# Patient Record
Sex: MALE | Race: WHITE | NOT HISPANIC OR LATINO | Employment: FULL TIME | URBAN - METROPOLITAN AREA
[De-identification: names, ages, dates, MRNs, and addresses within clinical notes are randomized per-mention and may not be internally consistent; named-entity substitution may affect disease eponyms.]

---

## 2023-10-17 PROBLEM — F41.9 ANXIETY: Status: ACTIVE | Noted: 2023-10-17

## 2023-10-17 PROBLEM — K21.9 GERD (GASTROESOPHAGEAL REFLUX DISEASE): Status: ACTIVE | Noted: 2023-10-17

## 2023-10-17 PROBLEM — I49.3 PVC (PREMATURE VENTRICULAR CONTRACTION): Status: ACTIVE | Noted: 2023-10-17

## 2023-10-17 PROBLEM — U09.9 POST COVID-19 CONDITION, UNSPECIFIED: Status: ACTIVE | Noted: 2023-10-17

## 2023-10-17 PROBLEM — J45.40 MODERATE PERSISTENT ASTHMA (HHS-HCC): Status: ACTIVE | Noted: 2023-10-17

## 2023-10-17 PROBLEM — L72.3 SEBACEOUS CYST: Status: ACTIVE | Noted: 2023-10-17

## 2023-10-17 PROBLEM — R91.1 LUNG NODULE: Status: ACTIVE | Noted: 2023-10-17

## 2023-10-17 PROBLEM — R93.89 ABNORMAL CHEST CT: Status: ACTIVE | Noted: 2023-10-17

## 2023-10-17 PROBLEM — R07.9 CHEST PAIN: Status: ACTIVE | Noted: 2023-10-17

## 2023-10-17 PROBLEM — J45.909 ASTHMA (HHS-HCC): Status: ACTIVE | Noted: 2023-10-17

## 2023-10-17 PROBLEM — R00.2 PALPITATIONS: Status: ACTIVE | Noted: 2023-10-17

## 2023-10-17 PROBLEM — R06.02 SHORTNESS OF BREATH: Status: ACTIVE | Noted: 2023-10-17

## 2023-10-17 PROBLEM — M79.672 LEFT FOOT PAIN: Status: ACTIVE | Noted: 2023-10-17

## 2023-10-17 PROBLEM — K57.90 DIVERTICULOSIS: Status: ACTIVE | Noted: 2023-10-17

## 2023-10-17 PROBLEM — E66.9 OBESITY: Status: ACTIVE | Noted: 2023-10-17

## 2023-10-17 PROBLEM — F17.200 NICOTINE DEPENDENCE: Status: ACTIVE | Noted: 2023-10-17

## 2023-10-17 RX ORDER — FAMOTIDINE 20 MG/1
1 TABLET, FILM COATED ORAL 2 TIMES DAILY
COMMUNITY
Start: 2022-09-02 | End: 2023-10-18 | Stop reason: ALTCHOICE

## 2023-10-17 RX ORDER — NEBIVOLOL 5 MG/1
1 TABLET ORAL DAILY
COMMUNITY
Start: 2019-08-05 | End: 2023-10-18 | Stop reason: SDUPTHER

## 2023-10-17 RX ORDER — OMEPRAZOLE 40 MG/1
1 CAPSULE, DELAYED RELEASE ORAL DAILY
COMMUNITY
Start: 2020-01-28 | End: 2023-10-18 | Stop reason: SDUPTHER

## 2023-10-17 RX ORDER — ALBUTEROL SULFATE 90 UG/1
2 AEROSOL, METERED RESPIRATORY (INHALATION)
COMMUNITY
Start: 2020-01-09 | End: 2023-10-18 | Stop reason: SDUPTHER

## 2023-10-17 RX ORDER — FLUTICASONE PROPIONATE AND SALMETEROL 232; 14 UG/1; UG/1
POWDER, METERED RESPIRATORY (INHALATION)
COMMUNITY
Start: 2023-03-13

## 2023-10-18 ENCOUNTER — OFFICE VISIT (OUTPATIENT)
Dept: PRIMARY CARE | Facility: CLINIC | Age: 43
End: 2023-10-18
Payer: COMMERCIAL

## 2023-10-18 VITALS
TEMPERATURE: 96.9 F | BODY MASS INDEX: 35.79 KG/M2 | HEART RATE: 93 BPM | DIASTOLIC BLOOD PRESSURE: 80 MMHG | WEIGHT: 250 LBS | SYSTOLIC BLOOD PRESSURE: 140 MMHG | OXYGEN SATURATION: 97 % | RESPIRATION RATE: 20 BRPM | HEIGHT: 70 IN

## 2023-10-18 DIAGNOSIS — R00.2 PALPITATIONS: ICD-10-CM

## 2023-10-18 DIAGNOSIS — Z13.220 NEED FOR LIPID SCREENING: ICD-10-CM

## 2023-10-18 DIAGNOSIS — K21.9 GASTROESOPHAGEAL REFLUX DISEASE, UNSPECIFIED WHETHER ESOPHAGITIS PRESENT: ICD-10-CM

## 2023-10-18 DIAGNOSIS — M10.9 ACUTE GOUT INVOLVING TOE OF LEFT FOOT, UNSPECIFIED CAUSE: Primary | ICD-10-CM

## 2023-10-18 DIAGNOSIS — J45.909 ASTHMA, UNSPECIFIED ASTHMA SEVERITY, UNSPECIFIED WHETHER COMPLICATED, UNSPECIFIED WHETHER PERSISTENT (HHS-HCC): ICD-10-CM

## 2023-10-18 PROCEDURE — 96372 THER/PROPH/DIAG INJ SC/IM: CPT | Performed by: INTERNAL MEDICINE

## 2023-10-18 PROCEDURE — 99213 OFFICE O/P EST LOW 20 MIN: CPT | Performed by: INTERNAL MEDICINE

## 2023-10-18 PROCEDURE — 1036F TOBACCO NON-USER: CPT | Performed by: INTERNAL MEDICINE

## 2023-10-18 RX ORDER — BUDESONIDE AND FORMOTEROL FUMARATE DIHYDRATE 160; 4.5 UG/1; UG/1
2 AEROSOL RESPIRATORY (INHALATION)
COMMUNITY
Start: 2019-03-25

## 2023-10-18 RX ORDER — KETOROLAC TROMETHAMINE 30 MG/ML
30 INJECTION, SOLUTION INTRAMUSCULAR; INTRAVENOUS ONCE
Status: SHIPPED | OUTPATIENT
Start: 2023-10-18 | End: 2023-10-23

## 2023-10-18 RX ORDER — OMEPRAZOLE 40 MG/1
40 CAPSULE, DELAYED RELEASE ORAL DAILY
Qty: 90 CAPSULE | Refills: 3 | Status: SHIPPED | OUTPATIENT
Start: 2023-10-18

## 2023-10-18 RX ORDER — NEBIVOLOL 5 MG/1
5 TABLET ORAL DAILY
Qty: 90 TABLET | Refills: 1 | Status: SHIPPED | OUTPATIENT
Start: 2023-10-18

## 2023-10-18 RX ORDER — KETOROLAC TROMETHAMINE 30 MG/ML
30 INJECTION, SOLUTION INTRAMUSCULAR; INTRAVENOUS ONCE
Status: DISCONTINUED | OUTPATIENT
Start: 2023-10-18 | End: 2023-10-18

## 2023-10-18 RX ORDER — COLCHICINE 0.6 MG/1
0.6 TABLET ORAL 2 TIMES DAILY
Qty: 20 TABLET | Refills: 0 | Status: SHIPPED | OUTPATIENT
Start: 2023-10-18 | End: 2023-11-30

## 2023-10-18 RX ORDER — KETOROLAC TROMETHAMINE 30 MG/ML
60 INJECTION, SOLUTION INTRAMUSCULAR; INTRAVENOUS ONCE
Status: DISCONTINUED | OUTPATIENT
Start: 2023-10-18 | End: 2023-10-18

## 2023-10-18 RX ORDER — KETOROLAC TROMETHAMINE 30 MG/ML
30 INJECTION, SOLUTION INTRAMUSCULAR; INTRAVENOUS ONCE
Status: COMPLETED | OUTPATIENT
Start: 2023-10-18 | End: 2023-10-18

## 2023-10-18 RX ORDER — ALBUTEROL SULFATE 90 UG/1
2 AEROSOL, METERED RESPIRATORY (INHALATION)
Qty: 18 G | Refills: 1 | Status: SHIPPED | OUTPATIENT
Start: 2023-10-18

## 2023-10-18 RX ADMIN — KETOROLAC TROMETHAMINE 30 MG: 30 INJECTION, SOLUTION INTRAMUSCULAR; INTRAVENOUS at 19:15

## 2023-10-18 SDOH — ECONOMIC STABILITY: FOOD INSECURITY: WITHIN THE PAST 12 MONTHS, YOU WORRIED THAT YOUR FOOD WOULD RUN OUT BEFORE YOU GOT MONEY TO BUY MORE.: NEVER TRUE

## 2023-10-18 SDOH — ECONOMIC STABILITY: FOOD INSECURITY: WITHIN THE PAST 12 MONTHS, THE FOOD YOU BOUGHT JUST DIDN'T LAST AND YOU DIDN'T HAVE MONEY TO GET MORE.: NEVER TRUE

## 2023-10-18 ASSESSMENT — ENCOUNTER SYMPTOMS
OCCASIONAL FEELINGS OF UNSTEADINESS: 0
DEPRESSION: 0
LOSS OF SENSATION IN FEET: 1

## 2023-10-18 ASSESSMENT — LIFESTYLE VARIABLES
HOW MANY STANDARD DRINKS CONTAINING ALCOHOL DO YOU HAVE ON A TYPICAL DAY: PATIENT DOES NOT DRINK
HOW OFTEN DO YOU HAVE SIX OR MORE DRINKS ON ONE OCCASION: NEVER
SKIP TO QUESTIONS 9-10: 1
HOW OFTEN DO YOU HAVE A DRINK CONTAINING ALCOHOL: NEVER
AUDIT-C TOTAL SCORE: 0

## 2023-10-18 ASSESSMENT — PATIENT HEALTH QUESTIONNAIRE - PHQ9
SUM OF ALL RESPONSES TO PHQ9 QUESTIONS 1 & 2: 0
2. FEELING DOWN, DEPRESSED OR HOPELESS: NOT AT ALL
1. LITTLE INTEREST OR PLEASURE IN DOING THINGS: NOT AT ALL

## 2023-10-18 ASSESSMENT — PAIN SCALES - GENERAL: PAINLEVEL: 6

## 2023-10-18 NOTE — ASSESSMENT & PLAN NOTE
Will give Toradol 60 mg IM x1 dose, will order Colchicine to take as needed.  Check labs as ordered to evaluate

## 2023-10-18 NOTE — PROGRESS NOTES
Chief Complaint/HPI:  Initial visit with me, previously saw Ms Esa Bravo for several years, patient states.    Asthma: patient state that that he has asthma since he was a child, he has taken Breo, he has a high deductible plan, he sees pulmonary medicine at [facility] , patient states that they prescribe his meds when they are needed    Foot pain: patient states that he gets severe pain in the base of the toe, he got severe pain a few days ago in the left foot that has started to subside. Patient states that provider previously thought that the symptoms were due to gout.   Patient states that the pain gets better and worse. Patient states that the toe swells , he is not able to wear his work boots due to the pain.    GERD: he takes omeprazole    ROS otherwise negative aside from what was mentioned above in HPI.      Patient Active Problem List   Diagnosis    Abnormal chest CT    Anxiety    Asthma    Chest pain    Diverticulosis    GERD (gastroesophageal reflux disease)    Left foot pain    Lung nodule    Moderate persistent asthma    Nicotine dependence    Obesity    Shortness of breath    Sebaceous cyst    PVC (premature ventricular contraction)    Post covid-19 condition, unspecified    Palpitations    Acute gout involving toe of left foot         Past Medical History:   Diagnosis Date    Allergic Age 5    Anxiety Age 29    Asthma Age 5    Depression Age30    Personal history of COVID-19     History of COVID-19    Personal history of other diseases of the digestive system 01/16/2019    History of diverticulosis    Personal history of other diseases of the respiratory system     History of asthma    Personal history of other malignant neoplasm of skin 01/16/2019    History of malignant neoplasm of skin    Personal history of other specified conditions 01/16/2019    History of chest pain     Past Surgical History:   Procedure Laterality Date    OTHER SURGICAL HISTORY  01/17/2019    [place] tooth  "extraction    OTHER SURGICAL HISTORY  01/08/2020    Cyst excision     Social History     Social History Narrative    Not on file         ALLERGIES  Amoxicillin-pot clavulanate, Escitalopram oxalate, and Fluoxetine      MEDICATIONS  Current Outpatient Medications on File Prior to Visit   Medication Sig Dispense Refill    AirDuo Digihaler 232-14 mcg/actuation inhaler       [DISCONTINUED] albuterol 90 mcg/actuation inhaler Inhale 2 puffs 3 times a day.      [DISCONTINUED] nebivolol (Bystolic) 5 mg tablet Take 1 tablet (5 mg) by mouth once daily.      [DISCONTINUED] omeprazole (PriLOSEC) 40 mg DR capsule Take 1 capsule (40 mg) by mouth once daily.      budesonide-formoteroL (Symbicort) 160-4.5 mcg/actuation inhaler Inhale 2 puffs 2 times a day.      [DISCONTINUED] famotidine (Pepcid) 20 mg tablet Take 1 tablet (20 mg) by mouth 2 times a day.      [DISCONTINUED] fluticasone furoate-vilanteroL (Breo Ellipta) 100-25 mcg/dose inhaler INHALE 1 PUFF BY MOUTH DAILY (Patient not taking: Reported on 10/18/2023) 60 each 0     No current facility-administered medications on file prior to visit.         PHYSICAL EXAM  /80 (BP Location: Left arm, Patient Position: Sitting, BP Cuff Size: Large adult)   Pulse 93   Temp 36.1 °C (96.9 °F)   Resp 20   Ht 1.765 m (5' 9.5\")   Wt 113 kg (250 lb)   SpO2 97%   BMI 36.39 kg/m²   Body mass index is 36.39 kg/m².  Gen: Alert, NAD  HEENT:  PERRLA, EOMI, conjunctiva and sclera normal in appearance  Respiratory:  Lungs CTAB  Cardiovascular:  Heart RRR. No M/R/G  Neuro:  Gross motor and sensory intact  MSK: Marked tenderness and moderate swelling of the MTP joint of the left great toe   Skin:  No suspicious lesions present    ASSESSMENT/PLAN  Problem List Items Addressed This Visit       Asthma    Relevant Medications    albuterol 90 mcg/actuation inhaler    Other Relevant Orders    Comprehensive metabolic panel    CBC and Auto Differential    GERD (gastroesophageal reflux disease) - " Primary    Relevant Medications    omeprazole (PriLOSEC) 40 mg DR capsule    Other Relevant Orders    Comprehensive metabolic panel    CBC and Auto Differential    Palpitations    Relevant Medications    nebivolol (Bystolic) 5 mg tablet    Other Relevant Orders    Comprehensive metabolic panel    CBC and Auto Differential    Acute gout involving toe of left foot    Current Assessment & Plan     Will give Toradol 60 mg IM x1 dose, will order Colchicine to take as needed.  Check labs as ordered to evaluate         Relevant Medications    ketorolac (Toradol) injection 60 mg    colchicine, gout, 0.6 mg tablet    Other Relevant Orders    Comprehensive metabolic panel    CBC and Auto Differential    Uric acid     Other Visit Diagnoses       Need for lipid screening        Relevant Orders    Lipid panel          Follow up after testing    Bijan Wisdom MD

## 2023-11-29 ENCOUNTER — TELEPHONE (OUTPATIENT)
Dept: PRIMARY CARE | Facility: CLINIC | Age: 43
End: 2023-11-29
Payer: COMMERCIAL

## 2023-11-29 DIAGNOSIS — M10.9 ACUTE GOUT INVOLVING TOE OF LEFT FOOT, UNSPECIFIED CAUSE: Primary | ICD-10-CM

## 2023-11-29 NOTE — TELEPHONE ENCOUNTER
Patient is requesting a new script for  colchicine, gout, 0.6 mg tablet.  Previous script is .    Pharmacy is [facility] in [place]    NOV 2024  LOV 10/18/2023

## 2023-11-30 DIAGNOSIS — M10.9 ACUTE GOUT INVOLVING TOE OF LEFT FOOT, UNSPECIFIED CAUSE: ICD-10-CM

## 2023-11-30 RX ORDER — COLCHICINE 0.6 MG/1
0.6 TABLET ORAL 2 TIMES DAILY
Qty: 180 TABLET | Refills: 1 | Status: SHIPPED | OUTPATIENT
Start: 2023-11-30 | End: 2023-11-30 | Stop reason: SDUPTHER

## 2023-11-30 RX ORDER — COLCHICINE 0.6 MG/1
0.6 TABLET ORAL 2 TIMES DAILY
Qty: 180 TABLET | Refills: 1 | Status: SHIPPED | OUTPATIENT
Start: 2023-11-30

## 2024-07-12 ENCOUNTER — APPOINTMENT (OUTPATIENT)
Dept: PULMONOLOGY | Facility: HOSPITAL | Age: 44
End: 2024-07-12
Payer: COMMERCIAL

## 2024-07-23 ENCOUNTER — OFFICE VISIT (OUTPATIENT)
Dept: PULMONOLOGY | Facility: HOSPITAL | Age: 44
End: 2024-07-23
Payer: COMMERCIAL

## 2024-07-23 VITALS
HEIGHT: 71 IN | HEART RATE: 80 BPM | WEIGHT: 259.8 LBS | SYSTOLIC BLOOD PRESSURE: 115 MMHG | OXYGEN SATURATION: 96 % | DIASTOLIC BLOOD PRESSURE: 78 MMHG | BODY MASS INDEX: 36.37 KG/M2 | RESPIRATION RATE: 16 BRPM

## 2024-07-23 DIAGNOSIS — J30.9 ALLERGIC RHINITIS, UNSPECIFIED SEASONALITY, UNSPECIFIED TRIGGER: ICD-10-CM

## 2024-07-23 DIAGNOSIS — J45.909 ASTHMA, UNSPECIFIED ASTHMA SEVERITY, UNSPECIFIED WHETHER COMPLICATED, UNSPECIFIED WHETHER PERSISTENT (HHS-HCC): Primary | ICD-10-CM

## 2024-07-23 PROCEDURE — 3008F BODY MASS INDEX DOCD: CPT | Performed by: NURSE PRACTITIONER

## 2024-07-23 PROCEDURE — 99213 OFFICE O/P EST LOW 20 MIN: CPT | Performed by: NURSE PRACTITIONER

## 2024-07-23 PROCEDURE — 1036F TOBACCO NON-USER: CPT | Performed by: NURSE PRACTITIONER

## 2024-07-23 RX ORDER — ALBUTEROL SULFATE 90 UG/1
2 AEROSOL, METERED RESPIRATORY (INHALATION) EVERY 4 HOURS PRN
Qty: 18 G | Refills: 11 | Status: SHIPPED | OUTPATIENT
Start: 2024-07-23

## 2024-07-23 ASSESSMENT — PATIENT HEALTH QUESTIONNAIRE - PHQ9
SUM OF ALL RESPONSES TO PHQ9 QUESTIONS 1 AND 2: 0
2. FEELING DOWN, DEPRESSED OR HOPELESS: NOT AT ALL
1. LITTLE INTEREST OR PLEASURE IN DOING THINGS: NOT AT ALL

## 2024-07-23 ASSESSMENT — ENCOUNTER SYMPTOMS
FATIGUE: 0
WHEEZING: 0
RHINORRHEA: 0
SHORTNESS OF BREATH: 1
UNEXPECTED WEIGHT CHANGE: 0
COUGH: 0
CHILLS: 0
FEVER: 0

## 2024-07-23 ASSESSMENT — COLUMBIA-SUICIDE SEVERITY RATING SCALE - C-SSRS
1. IN THE PAST MONTH, HAVE YOU WISHED YOU WERE DEAD OR WISHED YOU COULD GO TO SLEEP AND NOT WAKE UP?: NO
6. HAVE YOU EVER DONE ANYTHING, STARTED TO DO ANYTHING, OR PREPARED TO DO ANYTHING TO END YOUR LIFE?: NO
2. HAVE YOU ACTUALLY HAD ANY THOUGHTS OF KILLING YOURSELF?: NO

## 2024-07-23 NOTE — PROGRESS NOTES
Subjective   Patient ID: Eugene Chang is a 44 y.o. male who presents for follow up asthma.     HPI: Patient has  PMH of asthma for which he takes Symbicort 1 puff once in the morning only and takes prn. He was detected positive for COVID on August 24th and had symptoms about 5 days before that. He was not hospitalized and did not get any specific treatment for that. He did not get vaccination for covid. He did have cough, wheezing and SOB severely for a week and after that he just had SOB which has improved. He still gets tired easily and short of breath more easily but is not limiting his daily life. His POX is 96% on RA. He does not have coughing or wheezing at this time other than his usual asthma. He has been seeing Dr. Bahena for chest pain and his stress test was negative. He had CT cardiac scoring that showed a lung nodule 4mm at left base and thoracic aortic enlargement of 3.6 cm. He used to smoke 1 ppd but quit 10 years ago, smoked 14 years x 1 ppd. For last 10 years he has been vaping 24 mg nicotine but no THC. He has done THC in the past. He has hx of snoring but no EDS or fatigue and reports he has Sleep study which he states was normal. He reports he does not have any sinus symptoms at present.      Patient is here today for follow up. He is still struggling with cost of inhalers. He currently has Symbicort but it is very expensive. He states that otherwise his breathing is stable. He is still vaping. He has no other concerns.    Review of Systems   Constitutional:  Negative for chills, fatigue, fever and unexpected weight change.   HENT:  Positive for congestion. Negative for postnasal drip and rhinorrhea.    Respiratory:  Positive for shortness of breath. Negative for cough (denies hemoptysis.) and wheezing.    Cardiovascular:  Negative for chest pain and leg swelling.   All other systems reviewed and are negative.      Objective   Physical Exam  Vitals reviewed.   Constitutional:       Appearance:  Normal appearance.   HENT:      Head: Normocephalic.   Cardiovascular:      Rate and Rhythm: Normal rate and regular rhythm.   Pulmonary:      Effort: Pulmonary effort is normal.      Breath sounds: Normal breath sounds.   Skin:     General: Skin is warm and dry.   Neurological:      Mental Status: He is alert.         Assessment/Plan   1. Bronchial asthma  2. Allergic rhinitis   3. Vapes with nicotine      Plan:     -Continue Symbicort. Will send a referral to our pharmacy here as his inhalers are very expensive. Recommend continue ICS/LABA.   -Continue Allegra prn. Consider Singulair if s/s worsen again.  -We will follow up with chest imaging only as needed if he has respiratory symptoms.   -PFTs with FEV1/FVC 78, FEV1 90, .  -He is currently vaping with nicotine daily.     Overall his asthma is controlled, I will continue ICS/LABA preferred by his insurance and placed a referral to our pharmacy. I will bring him back with me in one year. I instructed patient to call sooner if needed.    Total time:  20 min.

## 2024-07-23 NOTE — PATIENT INSTRUCTIONS
Continue on Symbicort as discussed.  I placed a referral to our pharmacy.   Continue albuterol as needed.   Call with any questions or concerns.  Follow up with me in one year.

## 2024-07-31 ENCOUNTER — LAB (OUTPATIENT)
Dept: LAB | Facility: LAB | Age: 44
End: 2024-07-31
Payer: COMMERCIAL

## 2024-07-31 DIAGNOSIS — J45.909 ASTHMA, UNSPECIFIED ASTHMA SEVERITY, UNSPECIFIED WHETHER COMPLICATED, UNSPECIFIED WHETHER PERSISTENT (HHS-HCC): ICD-10-CM

## 2024-07-31 DIAGNOSIS — M10.9 ACUTE GOUT INVOLVING TOE OF LEFT FOOT, UNSPECIFIED CAUSE: ICD-10-CM

## 2024-07-31 DIAGNOSIS — Z13.220 NEED FOR LIPID SCREENING: ICD-10-CM

## 2024-07-31 DIAGNOSIS — R00.2 PALPITATIONS: ICD-10-CM

## 2024-07-31 DIAGNOSIS — K21.9 GASTROESOPHAGEAL REFLUX DISEASE, UNSPECIFIED WHETHER ESOPHAGITIS PRESENT: ICD-10-CM

## 2024-07-31 LAB
ALBUMIN SERPL BCP-MCNC: 4 G/DL (ref 3.4–5)
ALP SERPL-CCNC: 68 U/L (ref 33–120)
ALT SERPL W P-5'-P-CCNC: 22 U/L (ref 10–52)
ANION GAP SERPL CALC-SCNC: 10 MMOL/L (ref 10–20)
AST SERPL W P-5'-P-CCNC: 15 U/L (ref 9–39)
BASOPHILS # BLD AUTO: 0.04 X10*3/UL (ref 0–0.1)
BASOPHILS NFR BLD AUTO: 0.6 %
BILIRUB SERPL-MCNC: 0.3 MG/DL (ref 0–1.2)
BUN SERPL-MCNC: 18 MG/DL (ref 6–23)
CALCIUM SERPL-MCNC: 8.4 MG/DL (ref 8.6–10.3)
CHLORIDE SERPL-SCNC: 107 MMOL/L (ref 98–107)
CHOLEST SERPL-MCNC: 183 MG/DL (ref 0–199)
CHOLESTEROL/HDL RATIO: 4.6
CO2 SERPL-SCNC: 26 MMOL/L (ref 21–32)
CREAT SERPL-MCNC: 0.94 MG/DL (ref 0.5–1.3)
EGFRCR SERPLBLD CKD-EPI 2021: >90 ML/MIN/1.73M*2
EOSINOPHIL # BLD AUTO: 0.13 X10*3/UL (ref 0–0.7)
EOSINOPHIL NFR BLD AUTO: 1.9 %
ERYTHROCYTE [DISTWIDTH] IN BLOOD BY AUTOMATED COUNT: 12.6 % (ref 11.5–14.5)
GLUCOSE SERPL-MCNC: 101 MG/DL (ref 74–99)
HCT VFR BLD AUTO: 40.4 % (ref 41–52)
HDLC SERPL-MCNC: 39.4 MG/DL
HGB BLD-MCNC: 13.4 G/DL (ref 13.5–17.5)
IMM GRANULOCYTES # BLD AUTO: 0.01 X10*3/UL (ref 0–0.7)
IMM GRANULOCYTES NFR BLD AUTO: 0.1 % (ref 0–0.9)
LDLC SERPL CALC-MCNC: 127 MG/DL
LYMPHOCYTES # BLD AUTO: 1.86 X10*3/UL (ref 1.2–4.8)
LYMPHOCYTES NFR BLD AUTO: 26.8 %
MCH RBC QN AUTO: 29.4 PG (ref 26–34)
MCHC RBC AUTO-ENTMCNC: 33.2 G/DL (ref 32–36)
MCV RBC AUTO: 89 FL (ref 80–100)
MONOCYTES # BLD AUTO: 0.67 X10*3/UL (ref 0.1–1)
MONOCYTES NFR BLD AUTO: 9.6 %
NEUTROPHILS # BLD AUTO: 4.24 X10*3/UL (ref 1.2–7.7)
NEUTROPHILS NFR BLD AUTO: 61 %
NON HDL CHOLESTEROL: 144 MG/DL (ref 0–149)
NRBC BLD-RTO: 0 /100 WBCS (ref 0–0)
PLATELET # BLD AUTO: 278 X10*3/UL (ref 150–450)
POTASSIUM SERPL-SCNC: 4.3 MMOL/L (ref 3.5–5.3)
PROT SERPL-MCNC: 6.5 G/DL (ref 6.4–8.2)
RBC # BLD AUTO: 4.56 X10*6/UL (ref 4.5–5.9)
SODIUM SERPL-SCNC: 139 MMOL/L (ref 136–145)
TRIGL SERPL-MCNC: 83 MG/DL (ref 0–149)
URATE SERPL-MCNC: 8 MG/DL (ref 4–7.5)
VLDL: 17 MG/DL (ref 0–40)
WBC # BLD AUTO: 7 X10*3/UL (ref 4.4–11.3)

## 2024-07-31 PROCEDURE — 85025 COMPLETE CBC W/AUTO DIFF WBC: CPT

## 2024-07-31 PROCEDURE — 84550 ASSAY OF BLOOD/URIC ACID: CPT

## 2024-07-31 PROCEDURE — 80061 LIPID PANEL: CPT

## 2024-07-31 PROCEDURE — 80053 COMPREHEN METABOLIC PANEL: CPT

## 2024-07-31 PROCEDURE — 36415 COLL VENOUS BLD VENIPUNCTURE: CPT

## 2024-08-08 ENCOUNTER — HOSPITAL ENCOUNTER (OUTPATIENT)
Dept: RADIOLOGY | Facility: CLINIC | Age: 44
Discharge: HOME | End: 2024-08-08
Payer: COMMERCIAL

## 2024-08-08 ENCOUNTER — LAB (OUTPATIENT)
Dept: LAB | Facility: LAB | Age: 44
End: 2024-08-08
Payer: COMMERCIAL

## 2024-08-08 ENCOUNTER — APPOINTMENT (OUTPATIENT)
Dept: PRIMARY CARE | Facility: CLINIC | Age: 44
End: 2024-08-08
Payer: COMMERCIAL

## 2024-08-08 VITALS
WEIGHT: 259.5 LBS | OXYGEN SATURATION: 97 % | TEMPERATURE: 97.1 F | BODY MASS INDEX: 36.33 KG/M2 | SYSTOLIC BLOOD PRESSURE: 126 MMHG | HEIGHT: 71 IN | RESPIRATION RATE: 20 BRPM | HEART RATE: 86 BPM | DIASTOLIC BLOOD PRESSURE: 79 MMHG

## 2024-08-08 DIAGNOSIS — M25.512 CHRONIC LEFT SHOULDER PAIN: ICD-10-CM

## 2024-08-08 DIAGNOSIS — R73.9 HYPERGLYCEMIA: ICD-10-CM

## 2024-08-08 DIAGNOSIS — M25.512 CHRONIC LEFT SHOULDER PAIN: Primary | ICD-10-CM

## 2024-08-08 DIAGNOSIS — Z11.3 SCREENING FOR STD (SEXUALLY TRANSMITTED DISEASE): ICD-10-CM

## 2024-08-08 DIAGNOSIS — M10.9 ACUTE GOUT INVOLVING TOE OF LEFT FOOT, UNSPECIFIED CAUSE: ICD-10-CM

## 2024-08-08 DIAGNOSIS — Z00.00 ANNUAL PHYSICAL EXAM: ICD-10-CM

## 2024-08-08 DIAGNOSIS — G89.29 CHRONIC LEFT SHOULDER PAIN: Primary | ICD-10-CM

## 2024-08-08 DIAGNOSIS — G89.29 CHRONIC LEFT SHOULDER PAIN: ICD-10-CM

## 2024-08-08 DIAGNOSIS — Z12.11 SCREENING FOR COLON CANCER: ICD-10-CM

## 2024-08-08 DIAGNOSIS — R00.2 PALPITATIONS: ICD-10-CM

## 2024-08-08 DIAGNOSIS — Z11.59 NEED FOR HEPATITIS C SCREENING TEST: ICD-10-CM

## 2024-08-08 DIAGNOSIS — K21.9 GASTROESOPHAGEAL REFLUX DISEASE, UNSPECIFIED WHETHER ESOPHAGITIS PRESENT: ICD-10-CM

## 2024-08-08 PROBLEM — Z86.16 HISTORY OF SEVERE ACUTE RESPIRATORY SYNDROME CORONAVIRUS 2 (SARS-COV-2) DISEASE: Status: ACTIVE | Noted: 2024-08-08

## 2024-08-08 PROBLEM — Z85.828 HISTORY OF MALIGNANT NEOPLASM OF SKIN: Status: ACTIVE | Noted: 2018-06-25

## 2024-08-08 LAB
HCV AB SER QL: NONREACTIVE
HIV 1+2 AB+HIV1 P24 AG SERPL QL IA: NONREACTIVE

## 2024-08-08 PROCEDURE — 3008F BODY MASS INDEX DOCD: CPT

## 2024-08-08 PROCEDURE — 73030 X-RAY EXAM OF SHOULDER: CPT | Mod: LT

## 2024-08-08 PROCEDURE — 83036 HEMOGLOBIN GLYCOSYLATED A1C: CPT

## 2024-08-08 PROCEDURE — 99213 OFFICE O/P EST LOW 20 MIN: CPT

## 2024-08-08 PROCEDURE — 86803 HEPATITIS C AB TEST: CPT

## 2024-08-08 PROCEDURE — 87389 HIV-1 AG W/HIV-1&-2 AB AG IA: CPT

## 2024-08-08 PROCEDURE — 1036F TOBACCO NON-USER: CPT

## 2024-08-08 PROCEDURE — 99396 PREV VISIT EST AGE 40-64: CPT

## 2024-08-08 PROCEDURE — 36415 COLL VENOUS BLD VENIPUNCTURE: CPT

## 2024-08-08 RX ORDER — OMEPRAZOLE 40 MG/1
40 CAPSULE, DELAYED RELEASE ORAL DAILY
Qty: 90 CAPSULE | Refills: 3 | Status: SHIPPED | OUTPATIENT
Start: 2024-08-08

## 2024-08-08 RX ORDER — COLCHICINE 0.6 MG/1
0.6 TABLET ORAL 2 TIMES DAILY
Qty: 180 TABLET | Refills: 1 | Status: SHIPPED | OUTPATIENT
Start: 2024-08-08

## 2024-08-08 RX ORDER — NEBIVOLOL 5 MG/1
5 TABLET ORAL DAILY
Qty: 90 TABLET | Refills: 1 | Status: SHIPPED | OUTPATIENT
Start: 2024-08-08

## 2024-08-08 SDOH — ECONOMIC STABILITY: FOOD INSECURITY: WITHIN THE PAST 12 MONTHS, THE FOOD YOU BOUGHT JUST DIDN'T LAST AND YOU DIDN'T HAVE MONEY TO GET MORE.: NEVER TRUE

## 2024-08-08 SDOH — ECONOMIC STABILITY: FOOD INSECURITY: WITHIN THE PAST 12 MONTHS, YOU WORRIED THAT YOUR FOOD WOULD RUN OUT BEFORE YOU GOT MONEY TO BUY MORE.: NEVER TRUE

## 2024-08-08 ASSESSMENT — ENCOUNTER SYMPTOMS
EYES NEGATIVE: 1
LOSS OF SENSATION IN FEET: 0
GASTROINTESTINAL NEGATIVE: 1
PSYCHIATRIC NEGATIVE: 1
OCCASIONAL FEELINGS OF UNSTEADINESS: 0
RESPIRATORY NEGATIVE: 1
DEPRESSION: 0
ALLERGIC/IMMUNOLOGIC NEGATIVE: 1
ENDOCRINE NEGATIVE: 1
ARTHRALGIAS: 1
HEMATOLOGIC/LYMPHATIC NEGATIVE: 1
CONSTITUTIONAL NEGATIVE: 1
NEUROLOGICAL NEGATIVE: 1
CARDIOVASCULAR NEGATIVE: 1

## 2024-08-08 ASSESSMENT — ANXIETY QUESTIONNAIRES
4. TROUBLE RELAXING: NOT AT ALL
GAD7 TOTAL SCORE: 0
1. FEELING NERVOUS, ANXIOUS, OR ON EDGE: NOT AT ALL
2. NOT BEING ABLE TO STOP OR CONTROL WORRYING: NOT AT ALL
3. WORRYING TOO MUCH ABOUT DIFFERENT THINGS: NOT AT ALL
IF YOU CHECKED OFF ANY PROBLEMS ON THIS QUESTIONNAIRE, HOW DIFFICULT HAVE THESE PROBLEMS MADE IT FOR YOU TO DO YOUR WORK, TAKE CARE OF THINGS AT HOME, OR GET ALONG WITH OTHER PEOPLE: NOT DIFFICULT AT ALL
5. BEING SO RESTLESS THAT IT IS HARD TO SIT STILL: NOT AT ALL
7. FEELING AFRAID AS IF SOMETHING AWFUL MIGHT HAPPEN: NOT AT ALL
6. BECOMING EASILY ANNOYED OR IRRITABLE: NOT AT ALL

## 2024-08-08 ASSESSMENT — LIFESTYLE VARIABLES
HOW OFTEN DO YOU HAVE A DRINK CONTAINING ALCOHOL: NEVER
AUDIT-C TOTAL SCORE: 0
HOW MANY STANDARD DRINKS CONTAINING ALCOHOL DO YOU HAVE ON A TYPICAL DAY: PATIENT DOES NOT DRINK
SKIP TO QUESTIONS 9-10: 1
HOW OFTEN DO YOU HAVE SIX OR MORE DRINKS ON ONE OCCASION: NEVER

## 2024-08-08 ASSESSMENT — PAIN SCALES - GENERAL: PAINLEVEL: 4

## 2024-08-08 NOTE — PATIENT INSTRUCTIONS
Thank you for coming to see me today.  If you have any questions or concerns following our visit, please contact the office.  Phone: [phone]    Follow up with me in 1 year    1)  Complete lab work- I'll reach out with results    2) I have ordered a colonoscopy if you decide you would like to switch to [facility]. I recommend calling Dr. Lorenzo's office for a routine colonoscopy if you are wanting to stay with your current provider.

## 2024-08-08 NOTE — PROGRESS NOTES
"Subjective   Patient ID: Eugene Chang is a 44 y.o. male who presents for Follow-up (Last seen 10/18/23 no showed 04/18/24) and Shoulder Pain (Left shoulder (varies in pain 6 at it's worse) - since Spring - getting worse 1 month - not sure how injured shoulder - happened also in his 20's - xray showed a cartilage flap.).    Shoulder Pain        Appetite good, drinks plenty of water throughout the day  Sleeps through the night mostly- sometimes wakes up around 3 am and turns on the TV  No concerns with anxiety and depression  He endorses having left shoulder pain- he states this happened to him when he was in his 20s and was told it was a cartilage flap and that no intervention was warranted. He denies pain while his arm is at his side, but experiences pain when lifting his arm straight out (while driving) or to the side, and above his head. He is agreeable to xrays and seeing Dr. Gonzales with Orthopaedics.       Review of Systems   Constitutional: Negative.    HENT: Negative.     Eyes: Negative.    Respiratory: Negative.     Cardiovascular: Negative.    Gastrointestinal: Negative.    Endocrine: Negative.    Genitourinary: Negative.    Musculoskeletal:  Positive for arthralgias.   Skin: Negative.    Allergic/Immunologic: Negative.    Neurological: Negative.    Hematological: Negative.    Psychiatric/Behavioral: Negative.         Objective   /79 (BP Location: Right arm, Patient Position: Sitting, BP Cuff Size: Large adult)   Pulse 86   Temp 36.2 °C (97.1 °F)   Resp 20   Ht 1.803 m (5' 11\")   Wt 118 kg (259 lb 8 oz)   SpO2 97%   BMI 36.19 kg/m²     Physical Exam  HENT:      Right Ear: Tympanic membrane normal.      Left Ear: Tympanic membrane normal.   Cardiovascular:      Rate and Rhythm: Normal rate and regular rhythm.      Pulses: Normal pulses.      Heart sounds: Normal heart sounds.   Pulmonary:      Effort: Pulmonary effort is normal.      Breath sounds: Normal breath sounds.   Musculoskeletal:        "  General: Tenderness present.      Left shoulder: Tenderness present.        Arms:    Skin:     General: Skin is warm and dry.      Capillary Refill: Capillary refill takes less than 2 seconds.   Neurological:      Mental Status: He is oriented to person, place, and time.   Psychiatric:         Mood and Affect: Mood normal.         Behavior: Behavior normal.         Thought Content: Thought content normal.         Judgment: Judgment normal.         Assessment/Plan   Problem List Items Addressed This Visit             ICD-10-CM    GERD (gastroesophageal reflux disease) K21.9    Relevant Medications    omeprazole (PriLOSEC) 40 mg DR capsule    Other Relevant Orders    Referral to Orthopaedic Surgery    Palpitations R00.2    Relevant Medications    nebivolol (Bystolic) 5 mg tablet    Acute gout involving toe of left foot M10.9    Relevant Medications    colchicine 0.6 mg tablet    Chronic left shoulder pain - Primary M25.512, G89.29    Relevant Orders    XR shoulder left 2+ views     Other Visit Diagnoses         Codes    Hyperglycemia     R73.9    Relevant Orders    Hemoglobin A1C    Need for hepatitis C screening test     Z11.59    Relevant Orders    Hepatitis C Antibody    Screening for colon cancer     Z12.11    Relevant Orders    Colonoscopy Screening; Average Risk Patient    Screening for STD (sexually transmitted disease)     Z11.3    Relevant Orders    HIV 1/2 Antigen/Antibody Screen with Reflex to Confirmation    Annual physical exam     Z00.00

## 2024-08-09 ENCOUNTER — TELEPHONE (OUTPATIENT)
Dept: PRIMARY CARE | Facility: CLINIC | Age: 44
End: 2024-08-09
Payer: COMMERCIAL

## 2024-08-09 LAB
EST. AVERAGE GLUCOSE BLD GHB EST-MCNC: 111 MG/DL
HBA1C MFR BLD: 5.5 %

## 2024-08-13 ENCOUNTER — APPOINTMENT (OUTPATIENT)
Dept: PHARMACY | Facility: HOSPITAL | Age: 44
End: 2024-08-13
Payer: COMMERCIAL

## 2024-08-13 DIAGNOSIS — J45.909 ASTHMA, UNSPECIFIED ASTHMA SEVERITY, UNSPECIFIED WHETHER COMPLICATED, UNSPECIFIED WHETHER PERSISTENT (HHS-HCC): ICD-10-CM

## 2024-08-13 NOTE — PROGRESS NOTES
"  Pharmacist Clinic: Pulmonary Management    Eugene Chang is a 44 y.o. male was referred to Clinical Pharmacy Team for Pulmonary assessment.   Referring Provider: Nae Herndon APRN-C*  Last visit: 7/23/24  Next visit: 7/22/25    Subjective   Allergies   Allergen Reactions    Amoxicillin-Pot Clavulanate Unknown    Escitalopram Oxalate Unknown    Fluoxetine Unknown       HPI    PULMONARY ASSESSMENT  Patient has been diagnosed with: Asthma  has not had PFT's completed in last 2 years, though an assessment was done in 2020 due to COVID    Current regimen:  Symbicort 160-4.5 2 puff BID via Samples  Proair HFA PRN    Appropriate Inhaler Technique: yes    ASTHMA CONTROL:  -Primary symptoms: Chest tightness, dyspnea  -Symptoms/week: < or = 2 days/week  -Nighttime awakenings: < or = 2 times/month   -BEKA use: < or = 2 days/week  -Interference with normal activity: none  -Asthma Exacerbations (requiring oral steroids): 0-1/year    ALLERGY CONTROL:  -Allergies: Yes, describe: allergy induced asthma  -Irritants: environmental factor  -Allergy Control Measures: Symbicort      Objective   There were no vitals taken for this visit.    Secondary Prevention (vaccines):  -Influenza: Date [due yearly]  -PCV20: Date [may consider]  -COVID: Date [due]    Pulmonary Functions Testing Results:  No results found for: \"FEV1\", \"FVC\", \"MYP3MXY\", \"TLC\", \"DLCO\"    Lab Review  Lab Results   Component Value Date    BILITOT 0.3 07/31/2024    CALCIUM 8.4 (L) 07/31/2024    CO2 26 07/31/2024     07/31/2024    CREATININE 0.94 07/31/2024    GLUCOSE 101 (H) 07/31/2024    ALKPHOS 68 07/31/2024    K 4.3 07/31/2024    PROT 6.5 07/31/2024     07/31/2024    AST 15 07/31/2024    ALT 22 07/31/2024    BUN 18 07/31/2024    ANIONGAP 10 07/31/2024    ALBUMIN 4.0 07/31/2024     Hemoglobin   Date Value Ref Range Status   07/31/2024 13.4 (L) 13.5 - 17.5 g/dL Final     WBC   Date Value Ref Range Status   07/31/2024 7.0 4.4 - 11.3 x10*3/uL Final "     Platelets   Date Value Ref Range Status   07/31/2024 278 150 - 450 x10*3/uL Final       The 10-year ASCVD risk score (Concetta SINGLETON, et al., 2019) is: 2.3%    Values used to calculate the score:      Age: 44 years      Sex: Male      Is Non- : No      Diabetic: No      Tobacco smoker: No      Systolic Blood Pressure: 126 mmHg      Is BP treated: No      HDL Cholesterol: 39.4 mg/dL      Total Cholesterol: 183 mg/dL    Current Outpatient Medications   Medication Instructions    AirDuo Digihaler 232-14 mcg/actuation inhaler     albuterol 90 mcg/actuation inhaler 2 puffs, inhalation, Every 4 hours PRN    budesonide-formoteroL (Symbicort) 160-4.5 mcg/actuation inhaler 2 puffs, inhalation, 2 times daily RT    colchicine 0.6 mg, oral, 2 times daily    meloxicam (MOBIC) 15 mg, oral, Daily    nebivolol (BYSTOLIC) 5 mg, oral, Daily    omeprazole (PRILOSEC) 40 mg, oral, Daily       Drug Interactions:  None requiring intervention    Affordability/Accessibility:  High deductible insurance plan limits access to therapy, however discount cards are not being used.     Assessment/Plan   Problem List Items Addressed This Visit       Asthma ([facility])    Relevant Medications    budesonide-formoteroL (Symbicort) 160-4.5 mcg/actuation inhaler     ASSESSMENT:  Generally controlled asthma symptoms when cost is addressed on current therapy.     PLAN:  Continue current therapy; copay card for Symbicort seems most cost-effective. Sent order.     Follow up PRN    [place]      Continue all meds under the continuation of care with the referring provider and clinical pharmacy team.    Verbal consent to manage patient's drug therapy was obtained from the patient. They were informed they may decline to participate or withdraw from participation in pharmacy services at any time.

## 2024-08-21 ENCOUNTER — APPOINTMENT (OUTPATIENT)
Dept: ORTHOPEDIC SURGERY | Facility: CLINIC | Age: 44
End: 2024-08-21
Payer: COMMERCIAL

## 2024-08-21 VITALS — HEIGHT: 71 IN | WEIGHT: 259 LBS | BODY MASS INDEX: 36.26 KG/M2

## 2024-08-21 DIAGNOSIS — M25.512 LEFT SHOULDER PAIN, UNSPECIFIED CHRONICITY: ICD-10-CM

## 2024-08-21 DIAGNOSIS — K21.9 GASTROESOPHAGEAL REFLUX DISEASE, UNSPECIFIED WHETHER ESOPHAGITIS PRESENT: ICD-10-CM

## 2024-08-21 DIAGNOSIS — M19.012 OSTEOARTHRITIS OF LEFT AC (ACROMIOCLAVICULAR) JOINT: Primary | ICD-10-CM

## 2024-08-21 DIAGNOSIS — M25.812 SHOULDER IMPINGEMENT, LEFT: ICD-10-CM

## 2024-08-21 PROCEDURE — 1036F TOBACCO NON-USER: CPT | Performed by: STUDENT IN AN ORGANIZED HEALTH CARE EDUCATION/TRAINING PROGRAM

## 2024-08-21 PROCEDURE — 99204 OFFICE O/P NEW MOD 45 MIN: CPT | Performed by: STUDENT IN AN ORGANIZED HEALTH CARE EDUCATION/TRAINING PROGRAM

## 2024-08-21 PROCEDURE — 3008F BODY MASS INDEX DOCD: CPT | Performed by: STUDENT IN AN ORGANIZED HEALTH CARE EDUCATION/TRAINING PROGRAM

## 2024-08-21 RX ORDER — MELOXICAM 15 MG/1
15 TABLET ORAL DAILY
Qty: 30 TABLET | Refills: 1 | Status: SHIPPED | OUTPATIENT
Start: 2024-08-21 | End: 2024-10-20

## 2024-08-21 ASSESSMENT — PAIN - FUNCTIONAL ASSESSMENT: PAIN_FUNCTIONAL_ASSESSMENT: 0-10

## 2024-08-21 ASSESSMENT — PAIN SCALES - GENERAL: PAINLEVEL_OUTOF10: 2

## 2024-08-21 NOTE — PROGRESS NOTES
PRIMARY CARE PHYSICIAN: MIGUEL A Nunn  REFERRING PROVIDER: MIGUEL A Nunn  [address]  [facility], [place], [address]  [place] [id number]     CONSULT ORTHOPAEDIC: Shoulder Evaluation    ASSESSMENT & PLAN    Impression: 44 y.o. male with left shoulder AC joint osteoarthritis, left subacromial impingement    Plan:   I explained to the patient the nature of their diagnosis.  I reviewed their imaging studies with them.  He has symptoms of both of the above diagnoses.    Based on the history, physical exam and imaging studies above, the patient's presentation is consistent with the above diagnosis.  I had a long discussion with the patient regarding their presentation and the treatment options.  We discussed initial nonoperative versus operative management options as well as potential further diagnostic imaging.  I will refer him to my primary care sports medicine colleague for ultrasound-guided injections of his AC joint and subacromial space.  We will also give him a prescription of meloxicam, shoulder exercises, and recommended Voltaren gel.    Follow-Up: Patient will follow-up after his injections.  If his symptoms do not significantly improve we may consider an MRI    At the end of the visit, all questions were answered in full. The patient is in agreement with the plan and recommendations. They will call the office with any questions/concerns.    Note dictated with CamStent software. Completed without full typed error editing and sent to avoid delay.       SUBJECTIVE  CHIEF COMPLAINT: left shoulder pain     HPI: Eugene Chang is a 44 y.o. left-hand-dominant patient who presents today with left shoulder pain for 6 months.  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  Pain is worse when reaching overhead and lying on his left shoulder.  He has tried some over-the-counter anti-inflammatory  medication.  He has not had any injections nor physical therapy.    They deny any associated neck pain.  No numbness, tingling, or paresthesias.    REVIEW OF SYSTEMS  Constitutional: See HPI for pain assessment, No significant weight loss, recent trauma  Cardiovascular: No chest pain, shortness of breath  Respiratory: No difficulty breathing, cough  Gastrointestinal: No nausea, vomiting, diarrhea, constipation  Musculoskeletal: Noted in HPI, positive for pain, restricted motion, stiffness  Integumentary: No rashes, easy bruising, redness   Neurological: no numbness or tingling in extremities, no gait disturbances   Psychiatric: No mood changes, memory changes, social issues  Heme/Lymph: no excessive swelling, easy bruising, excessive bleeding  ENT: No hearing changes  Eyes: No vision changes    Past Medical History:   Diagnosis Date    Allergic Age 5    Anxiety Age 29    Asthma ([facility]) Age 5    Depression Age30    Personal history of COVID-19     History of COVID-19    Personal history of other diseases of the digestive system 01/16/2019    History of diverticulosis    Personal history of other diseases of the respiratory system     History of asthma    Personal history of other malignant neoplasm of skin 01/16/2019    History of malignant neoplasm of skin    Personal history of other specified conditions 01/16/2019    History of chest pain        Allergies   Allergen Reactions    Amoxicillin-Pot Clavulanate Unknown    Escitalopram Oxalate Unknown    Fluoxetine Unknown        Past Surgical History:   Procedure Laterality Date    OTHER SURGICAL HISTORY  01/17/2019    [place] tooth extraction    OTHER SURGICAL HISTORY  01/08/2020    Cyst excision        Family History   Problem Relation Name Age of Onset    Ovarian cancer Mother Carol Chang     Diabetes Father JOSE A Chang     Heart disease Father E Moises Chang     Cancer Maternal Grandmother Renetta Gan     Colon cancer Maternal Grandmother Renetta Gan      Stroke Maternal Grandmother Renetta Gan     Leukemia Paternal Grandfather          Social History     Socioeconomic History    Marital status: Single     Spouse name: Not on file    Number of children: Not on file    Years of education: Not on file    Highest education level: Not on file   Occupational History    Not on file   Tobacco Use    Smoking status: Former     Current packs/day: 0.00     Average packs/day: 1 pack/day for 15.3 years (15.3 ttl pk-yrs)     Types: Cigarettes     Start date: 1996     Quit date: 10/1/2011     Years since quittin.8     Passive exposure: Past    Smokeless tobacco: Never   Vaping Use    Vaping status: Every Day    Substances: Nicotine, Flavoring    Devices: Refillable tank   Substance and Sexual Activity    Alcohol use: Not Currently    Drug use: Never    Sexual activity: Yes     Partners: Female   Other Topics Concern    Not on file   Social History Narrative    Not on file     Social Determinants of Health     Financial Resource Strain: Not on file   Food Insecurity: No Food Insecurity (2024)    Hunger Vital Sign     Worried About Running Out of Food in the Last Year: Never true     Ran Out of Food in the Last Year: Never true   Transportation Needs: Not on file   Physical Activity: Not on file   Stress: Not on file   Social Connections: Not on file   Intimate Partner Violence: Not on file   Housing Stability: Not on file        CURRENT MEDICATIONS:   Current Outpatient Medications   Medication Sig Dispense Refill    AirDuo Digihaler 232-14 mcg/actuation inhaler       albuterol 90 mcg/actuation inhaler Inhale 2 puffs every 4 hours if needed for wheezing or shortness of breath. 18 g 11    budesonide-formoteroL (Symbicort) 160-4.5 mcg/actuation inhaler Inhale 2 puffs 2 times a day.      colchicine 0.6 mg tablet Take 1 tablet (0.6 mg) by mouth 2 times a day. 180 tablet 1    nebivolol (Bystolic) 5 mg tablet Take 1 tablet (5 mg) by mouth once daily. 90 tablet 1     "omeprazole (PriLOSEC) 40 mg DR capsule Take 1 capsule (40 mg) by mouth once daily. 90 capsule 3     No current facility-administered medications for this visit.        OBJECTIVE    PHYSICAL EXAM      7/6/2022     2:38 PM 9/2/2022     9:22 AM 11/28/2022     5:34 PM 7/14/2023     1:31 PM 10/18/2023     4:24 PM 7/23/2024    11:24 AM 8/8/2024     9:49 AM   Vitals   Systolic 144 128 118 112 140 115 126   Diastolic 77 82 78 76 80 78 79   Heart Rate 106 90 85 89 93 80 86   Temp 36.2 °C (97.1 °F) 36.4 °C (97.5 °F)  36.9 °C (98.4 °F) 36.1 °C (96.9 °F)  36.2 °C (97.1 °F)   Resp 16  16 16 20 16 20   Height (in)  1.759 m (5' 9.25\") 1.759 m (5' 9.25\")  1.765 m (5' 9.5\") 1.803 m (5' 11\") 1.803 m (5' 11\")   Weight (lb) 261.2 265 264  250 259.8 259.5   BMI 38.29 kg/m2 38.85 kg/m2 38.71 kg/m2  36.39 kg/m2 36.23 kg/m2 36.19 kg/m2   BSA (m2) 2.4 m2 2.42 m2 2.42 m2  2.35 m2 2.43 m2 2.43 m2   Visit Report     Report Report Report      There is no height or weight on file to calculate BMI.    GENERAL: A/Ox3, NAD. Appears healthy, well nourished  PSYCHIATRIC: Mood stable, appropriate memory recall  EYES: EOM intact, no scleral icterus  CARDIOVASCULAR: Palpable peripheral pulses  LUNGS: Breathing non-labored on room air  SKIN: no erythema, rashes, or ecchymosis     MUSCULOSKELETAL:  Laterality: left Shoulder Exam  - Negative Spurlings, full painless neck ROM  - Skin intact  - No erythema or warmth  - No ecchymosis or soft tissue swelling  - Shoulder ROM: 160 forward flexion 160 abduction 60 external rotation internal rotation to T7  - Strength:      Jobes 5/5     ER 5/5     Belly press and bearhug 5/5  - Palpation: Positive tenderness of the AC joint and posterior lateral acromion  - Veras and Neers negative  - Speeds neg and O'Briens positive  - Special Tests: Pain with cross body adduction    NEUROVASCULAR:  - Neurovascular Status: sensation intact to light touch distally, upper extremity motor grossly intact  - Capillary refill brisk " at extremities, Bilateral peripheral pulses 2+    Imaging: Multiple views of the affected left shoulder(s) demonstrate: No fracture or dislocation.  Mild AC joint osteoarthritis.   X-rays were personally reviewed and interpreted by me.  Radiology reports were reviewed by me as well, if readily available at the time.      Spencer Gonzales MD  Attending Surgeon    Sports Medicine Orthopaedic Surgery  [facility] Sports Medicine [facility]

## 2024-09-03 ENCOUNTER — APPOINTMENT (OUTPATIENT)
Dept: ORTHOPEDIC SURGERY | Facility: CLINIC | Age: 44
End: 2024-09-03
Payer: COMMERCIAL

## 2024-09-03 ENCOUNTER — HOSPITAL ENCOUNTER (OUTPATIENT)
Dept: RADIOLOGY | Facility: EXTERNAL LOCATION | Age: 44
Discharge: HOME | End: 2024-09-03

## 2024-09-03 VITALS — BODY MASS INDEX: 36.26 KG/M2 | WEIGHT: 259 LBS | HEIGHT: 71 IN

## 2024-09-03 DIAGNOSIS — M25.512 LEFT SHOULDER PAIN, UNSPECIFIED CHRONICITY: ICD-10-CM

## 2024-09-03 DIAGNOSIS — M75.42 ROTATOR CUFF IMPINGEMENT SYNDROME, LEFT: Primary | ICD-10-CM

## 2024-09-03 DIAGNOSIS — M19.012 OSTEOARTHRITIS OF LEFT ACROMIOCLAVICULAR JOINT: ICD-10-CM

## 2024-09-03 PROCEDURE — 3008F BODY MASS INDEX DOCD: CPT | Performed by: EMERGENCY MEDICINE

## 2024-09-03 PROCEDURE — 99204 OFFICE O/P NEW MOD 45 MIN: CPT | Performed by: EMERGENCY MEDICINE

## 2024-09-03 PROCEDURE — 20611 DRAIN/INJ JOINT/BURSA W/US: CPT | Performed by: EMERGENCY MEDICINE

## 2024-09-03 RX ORDER — TRIAMCINOLONE ACETONIDE 40 MG/ML
80 INJECTION, SUSPENSION INTRA-ARTICULAR; INTRAMUSCULAR
Status: COMPLETED | OUTPATIENT
Start: 2024-09-03 | End: 2024-09-03

## 2024-09-03 RX ORDER — LIDOCAINE HYDROCHLORIDE 10 MG/ML
2 INJECTION INFILTRATION; PERINEURAL
Status: COMPLETED | OUTPATIENT
Start: 2024-09-03 | End: 2024-09-03

## 2024-09-03 NOTE — PROGRESS NOTES
Subjective    Patient ID: Eugene Chang is a 44 y.o. male.    Chief Complaint: Pain of the Left Shoulder (Referred by Dr. Gonzales for an injection)     Last Surgery: No surgery found  Last Surgery Date: No surgery found    Eugene is a very pleasant 44-year-old male who is coming in with some acute on chronic left shoulder pain.  He states that he has had pain since March and he was recently seen by one of my colleagues in orthopedic surgery, Dr. Gonzales, who referred him here for nonsurgical evaluation and management.  He was prescribed meloxicam and says that since his appointment he is actually starting to feel a lot better.  He was sent here for a potential AC versus a subacromial injection.  His pain mostly is lateral and posterior and worse with range of motion.  To touch, he does have some pain over the AC joint but states that it is more mild.  He denies any recent traumatic events or known injuries.  No weakness or numbness.        Objective   Right Shoulder Exam   Right shoulder exam is normal.      Left Shoulder Exam     Tenderness   The patient is experiencing tenderness in the acromioclavicular joint.    Range of Motion   The patient has normal left shoulder ROM.    Muscle Strength   The patient has normal left shoulder strength.  Abduction: 5/5   Internal rotation: 5/5   External rotation: 5/5   Supraspinatus: 5/5   Subscapularis: 5/5   Biceps: 5/5     Tests   Veras test: positive  Cross arm: positive  Impingement: positive    Other   Erythema: absent  Sensation: normal  Pulse: present             Image Results:  Point of Care Ultrasound  These images are not reportable by radiology and will not be interpreted   by  Radiologists.    X-rays of the left shoulder were reviewed and interpreted by me on 9/3/2024 and were grossly unremarkable aside from some degenerative changes at the left AC joint.  No evidence of acute injuries or fractures.    Patient ID: Eugene Chang is a 44 y.o. male.    L Inj/Asp: L  subacromial bursa on 9/3/2024 4:09 PM  Indications: pain  Details: 22 G needle, ultrasound-guided lateral approach  Medications: 80 mg triamcinolone acetonide 40 mg/mL; 2 mL lidocaine 10 mg/mL (1 %)  Outcome: tolerated well, no immediate complications  Procedure, treatment alternatives, risks and benefits explained, specific risks discussed. Consent was given by the patient. Immediately prior to procedure a time out was called to verify the correct patient, procedure, equipment, support staff and site/side marked as required. Patient was prepped and draped in the usual sterile fashion.           Assessment/Plan   Encounter Diagnoses:  Rotator cuff impingement syndrome, left    Left shoulder pain, unspecified chronicity    Osteoarthritis of left acromioclavicular joint    Orders Placed This Encounter    L Inj/Asp    Point of Care Ultrasound    Referral to Physical Therapy     No follow-ups on file.    We discussed his treatment options and eventually agreed to perform a left subacromial cortisone injection under ultrasound guidance here today in the office.  The patient tolerated the procedure well without any complications and activity modifications were reviewed.  He is going to start working on a home exercise program as he does physical therapy and will continue the prescribed meloxicam.  He is going to follow-up with me as needed moving forward and we could always consider doing an AC joint injection depending on his response to today's injection.    ** Please excuse any errors in grammar or translation related to this dictation. Voice recognition software was utilized to prepare this document. **       Kirt Lara MD  [facility] Sports Medicine

## 2024-10-14 DIAGNOSIS — M25.512 LEFT SHOULDER PAIN, UNSPECIFIED CHRONICITY: ICD-10-CM

## 2024-10-14 RX ORDER — MELOXICAM 15 MG/1
15 TABLET ORAL DAILY
Qty: 30 TABLET | Refills: 0 | Status: SHIPPED | OUTPATIENT
Start: 2024-10-14

## 2024-11-15 DIAGNOSIS — M25.512 LEFT SHOULDER PAIN, UNSPECIFIED CHRONICITY: ICD-10-CM

## 2024-11-17 RX ORDER — MELOXICAM 15 MG/1
15 TABLET ORAL DAILY
Qty: 30 TABLET | Refills: 0 | Status: SHIPPED | OUTPATIENT
Start: 2024-11-17

## 2024-12-02 ENCOUNTER — TELEPHONE (OUTPATIENT)
Facility: CLINIC | Age: 44
End: 2024-12-02
Payer: COMMERCIAL

## 2024-12-02 NOTE — TELEPHONE ENCOUNTER
----- Message from Johana MARINELLI sent at 11/21/2024  9:47 AM EST -----  Regarding: RE: OPEN ACCESS  Due to Patient's availability he is requesting a call to schedule once January 2025 is available  ----- Message -----  From: Annemarie Lopez RN  Sent: 11/20/2024   1:39 PM EST  To: [id number]er[facility]  Subject: OPEN ACCESS                                      [facility]

## 2024-12-17 DIAGNOSIS — M25.512 LEFT SHOULDER PAIN, UNSPECIFIED CHRONICITY: ICD-10-CM

## 2024-12-17 RX ORDER — MELOXICAM 15 MG/1
15 TABLET ORAL DAILY
Qty: 30 TABLET | Refills: 0 | Status: SHIPPED | OUTPATIENT
Start: 2024-12-17

## 2025-01-02 ENCOUNTER — APPOINTMENT (OUTPATIENT)
Dept: RADIOLOGY | Facility: HOSPITAL | Age: 45
End: 2025-01-02
Payer: COMMERCIAL

## 2025-01-02 ENCOUNTER — HOSPITAL ENCOUNTER (EMERGENCY)
Facility: HOSPITAL | Age: 45
Discharge: HOME | End: 2025-01-02
Attending: STUDENT IN AN ORGANIZED HEALTH CARE EDUCATION/TRAINING PROGRAM
Payer: COMMERCIAL

## 2025-01-02 VITALS
BODY MASS INDEX: 35.79 KG/M2 | WEIGHT: 250 LBS | RESPIRATION RATE: 13 BRPM | HEART RATE: 66 BPM | DIASTOLIC BLOOD PRESSURE: 92 MMHG | SYSTOLIC BLOOD PRESSURE: 138 MMHG | TEMPERATURE: 98.5 F | HEIGHT: 70 IN | OXYGEN SATURATION: 98 %

## 2025-01-02 DIAGNOSIS — R07.9 CHEST PAIN, UNSPECIFIED TYPE: Primary | ICD-10-CM

## 2025-01-02 LAB
ANION GAP SERPL CALC-SCNC: 11 MMOL/L (ref 10–20)
BASOPHILS # BLD AUTO: 0.05 X10*3/UL (ref 0–0.1)
BASOPHILS NFR BLD AUTO: 0.5 %
BUN SERPL-MCNC: 18 MG/DL (ref 6–23)
CALCIUM SERPL-MCNC: 8.4 MG/DL (ref 8.6–10.3)
CARDIAC TROPONIN I PNL SERPL HS: <3 NG/L (ref 0–20)
CARDIAC TROPONIN I PNL SERPL HS: <3 NG/L (ref 0–20)
CHLORIDE SERPL-SCNC: 106 MMOL/L (ref 98–107)
CO2 SERPL-SCNC: 27 MMOL/L (ref 21–32)
CREAT SERPL-MCNC: 0.84 MG/DL (ref 0.5–1.3)
EGFRCR SERPLBLD CKD-EPI 2021: >90 ML/MIN/1.73M*2
EOSINOPHIL # BLD AUTO: 0.09 X10*3/UL (ref 0–0.7)
EOSINOPHIL NFR BLD AUTO: 0.9 %
ERYTHROCYTE [DISTWIDTH] IN BLOOD BY AUTOMATED COUNT: 12.3 % (ref 11.5–14.5)
GLUCOSE SERPL-MCNC: 96 MG/DL (ref 74–99)
HCT VFR BLD AUTO: 36.8 % (ref 41–52)
HGB BLD-MCNC: 12.5 G/DL (ref 13.5–17.5)
IMM GRANULOCYTES # BLD AUTO: 0.04 X10*3/UL (ref 0–0.7)
IMM GRANULOCYTES NFR BLD AUTO: 0.4 % (ref 0–0.9)
LYMPHOCYTES # BLD AUTO: 2.16 X10*3/UL (ref 1.2–4.8)
LYMPHOCYTES NFR BLD AUTO: 22.1 %
MCH RBC QN AUTO: 30.3 PG (ref 26–34)
MCHC RBC AUTO-ENTMCNC: 34 G/DL (ref 32–36)
MCV RBC AUTO: 89 FL (ref 80–100)
MONOCYTES # BLD AUTO: 0.68 X10*3/UL (ref 0.1–1)
MONOCYTES NFR BLD AUTO: 7 %
NEUTROPHILS # BLD AUTO: 6.76 X10*3/UL (ref 1.2–7.7)
NEUTROPHILS NFR BLD AUTO: 69.1 %
NRBC BLD-RTO: 0 /100 WBCS (ref 0–0)
PLATELET # BLD AUTO: 261 X10*3/UL (ref 150–450)
POTASSIUM SERPL-SCNC: 4 MMOL/L (ref 3.5–5.3)
RBC # BLD AUTO: 4.13 X10*6/UL (ref 4.5–5.9)
SODIUM SERPL-SCNC: 140 MMOL/L (ref 136–145)
WBC # BLD AUTO: 9.8 X10*3/UL (ref 4.4–11.3)

## 2025-01-02 PROCEDURE — 2500000004 HC RX 250 GENERAL PHARMACY W/ HCPCS (ALT 636 FOR OP/ED): Performed by: STUDENT IN AN ORGANIZED HEALTH CARE EDUCATION/TRAINING PROGRAM

## 2025-01-02 PROCEDURE — 36415 COLL VENOUS BLD VENIPUNCTURE: CPT | Performed by: STUDENT IN AN ORGANIZED HEALTH CARE EDUCATION/TRAINING PROGRAM

## 2025-01-02 PROCEDURE — 71046 X-RAY EXAM CHEST 2 VIEWS: CPT | Performed by: RADIOLOGY

## 2025-01-02 PROCEDURE — 85025 COMPLETE CBC W/AUTO DIFF WBC: CPT | Performed by: STUDENT IN AN ORGANIZED HEALTH CARE EDUCATION/TRAINING PROGRAM

## 2025-01-02 PROCEDURE — 96374 THER/PROPH/DIAG INJ IV PUSH: CPT

## 2025-01-02 PROCEDURE — 2500000002 HC RX 250 W HCPCS SELF ADMINISTERED DRUGS (ALT 637 FOR MEDICARE OP, ALT 636 FOR OP/ED): Performed by: STUDENT IN AN ORGANIZED HEALTH CARE EDUCATION/TRAINING PROGRAM

## 2025-01-02 PROCEDURE — 71046 X-RAY EXAM CHEST 2 VIEWS: CPT

## 2025-01-02 PROCEDURE — 80048 BASIC METABOLIC PNL TOTAL CA: CPT | Performed by: STUDENT IN AN ORGANIZED HEALTH CARE EDUCATION/TRAINING PROGRAM

## 2025-01-02 PROCEDURE — 94640 AIRWAY INHALATION TREATMENT: CPT

## 2025-01-02 PROCEDURE — 84484 ASSAY OF TROPONIN QUANT: CPT | Performed by: STUDENT IN AN ORGANIZED HEALTH CARE EDUCATION/TRAINING PROGRAM

## 2025-01-02 PROCEDURE — 99284 EMERGENCY DEPT VISIT MOD MDM: CPT | Mod: 25 | Performed by: STUDENT IN AN ORGANIZED HEALTH CARE EDUCATION/TRAINING PROGRAM

## 2025-01-02 RX ORDER — IPRATROPIUM BROMIDE AND ALBUTEROL SULFATE 2.5; .5 MG/3ML; MG/3ML
3 SOLUTION RESPIRATORY (INHALATION) ONCE
Status: COMPLETED | OUTPATIENT
Start: 2025-01-02 | End: 2025-01-02

## 2025-01-02 RX ORDER — LORAZEPAM 2 MG/ML
0.5 INJECTION INTRAMUSCULAR ONCE
Status: COMPLETED | OUTPATIENT
Start: 2025-01-02 | End: 2025-01-02

## 2025-01-02 RX ADMIN — IPRATROPIUM BROMIDE AND ALBUTEROL SULFATE 3 ML: 2.5; .5 SOLUTION RESPIRATORY (INHALATION) at 13:21

## 2025-01-02 RX ADMIN — LORAZEPAM 0.5 MG: 2 INJECTION INTRAMUSCULAR; INTRAVENOUS at 16:21

## 2025-01-02 ASSESSMENT — LIFESTYLE VARIABLES
EVER FELT BAD OR GUILTY ABOUT YOUR DRINKING: NO
HAVE PEOPLE ANNOYED YOU BY CRITICIZING YOUR DRINKING: NO
EVER HAD A DRINK FIRST THING IN THE MORNING TO STEADY YOUR NERVES TO GET RID OF A HANGOVER: NO
HAVE YOU EVER FELT YOU SHOULD CUT DOWN ON YOUR DRINKING: NO
TOTAL SCORE: 0

## 2025-01-02 ASSESSMENT — COLUMBIA-SUICIDE SEVERITY RATING SCALE - C-SSRS
2. HAVE YOU ACTUALLY HAD ANY THOUGHTS OF KILLING YOURSELF?: NO
1. IN THE PAST MONTH, HAVE YOU WISHED YOU WERE DEAD OR WISHED YOU COULD GO TO SLEEP AND NOT WAKE UP?: NO
6. HAVE YOU EVER DONE ANYTHING, STARTED TO DO ANYTHING, OR PREPARED TO DO ANYTHING TO END YOUR LIFE?: NO

## 2025-01-02 ASSESSMENT — PAIN DESCRIPTION - LOCATION: LOCATION: CHEST

## 2025-01-02 ASSESSMENT — PAIN SCALES - GENERAL: PAINLEVEL_OUTOF10: 5 - MODERATE PAIN

## 2025-01-02 ASSESSMENT — PAIN DESCRIPTION - PAIN TYPE: TYPE: ACUTE PAIN

## 2025-01-02 ASSESSMENT — PAIN - FUNCTIONAL ASSESSMENT: PAIN_FUNCTIONAL_ASSESSMENT: 0-10

## 2025-01-02 ASSESSMENT — PAIN DESCRIPTION - DESCRIPTORS: DESCRIPTORS: SHOOTING

## 2025-01-02 NOTE — ED PROVIDER NOTES
HPI   Chief Complaint   Patient presents with    Chest Pain       44-year-old male with past med history of asthma, anxiety, depression presented ED with concerns for chest pain.  Patient over last 4 days he has had intermittent episodes of a sharp right-sided chest pain.  Says the last for about a second and then go away.  Is also been having more persistent chest tightness he describes.  Denies any associated diaphoresis or vomiting with the pain.  Pain does not come on with exertion.  No radiation to the arms or back.  No leg swelling or calf pain.  No prior VTE, recent surgery, immobilization or active cancer.  No fever or cough.              Patient History   Past Medical History:   Diagnosis Date    Allergic Age 5    Anxiety Age 29    Asthma ([facility]) Age 5    Depression Age30    Personal history of COVID-19     History of COVID-19    Personal history of other diseases of the digestive system 01/16/2019    History of diverticulosis    Personal history of other diseases of the respiratory system     History of asthma    Personal history of other malignant neoplasm of skin 01/16/2019    History of malignant neoplasm of skin    Personal history of other specified conditions 01/16/2019    History of chest pain     Past Surgical History:   Procedure Laterality Date    OTHER SURGICAL HISTORY  01/17/2019    [place] tooth extraction    OTHER SURGICAL HISTORY  01/08/2020    Cyst excision     Family History   Problem Relation Name Age of Onset    Ovarian cancer Mother Carol Chang     Diabetes Father JOSE A Chang     Heart disease Father E Moises Chang     Cancer Maternal Grandmother Renetta Streator     Colon cancer Maternal Grandmother Renetta Malik     Stroke Maternal Grandmother Renetta Malik     Leukemia Paternal Grandfather       Social History     Tobacco Use    Smoking status: Former     Current packs/day: 0.00     Average packs/day: 1 pack/day for 15.3 years (15.3 ttl pk-yrs)     Types: Cigarettes     Start  date: 1996     Quit date: 10/1/2011     Years since quittin.2     Passive exposure: Past    Smokeless tobacco: Never   Vaping Use    Vaping status: Every Day    Substances: Nicotine, Flavoring    Devices: Refillable tank   Substance Use Topics    Alcohol use: Not Currently    Drug use: Never       Physical Exam   ED Triage Vitals [25 1245]   Temperature Heart Rate Respirations BP   36.9 °C (98.5 °F) 68 16 (!) 153/93      Pulse Ox Temp Source Heart Rate Source Patient Position   96 % Oral -- --      BP Location FiO2 (%)     -- --       Physical Exam  Vitals and nursing note reviewed.   Constitutional:       General: He is not in acute distress.     Appearance: He is well-developed.   HENT:      Head: Normocephalic and atraumatic.   Eyes:      Conjunctiva/sclera: Conjunctivae normal.   Cardiovascular:      Rate and Rhythm: Normal rate and regular rhythm.      Pulses:           Radial pulses are 2+ on the right side and 2+ on the left side.        Dorsalis pedis pulses are 2+ on the right side and 2+ on the left side.        Posterior tibial pulses are 2+ on the right side.      Heart sounds: No murmur heard.  Pulmonary:      Effort: Pulmonary effort is normal. No respiratory distress.      Breath sounds: Normal breath sounds.   Abdominal:      Palpations: Abdomen is soft.      Tenderness: There is no abdominal tenderness.   Musculoskeletal:         General: No swelling.      Cervical back: Neck supple.      Right lower leg: No tenderness. No edema.      Left lower leg: No tenderness. No edema.   Skin:     General: Skin is warm and dry.      Capillary Refill: Capillary refill takes less than 2 seconds.   Neurological:      Mental Status: He is alert.   Psychiatric:         Mood and Affect: Mood normal.           ED Course & MDM   ED Course as of 25 1607   Thu 2025   1306 EKG shows sinus rhythm.  No STEMI.  Normal intervals.  Left axis. [RS]      ED Course User Index  [RS] Arslan Wiggins DO          Diagnoses as of 01/02/25 1607   Chest pain, unspecified type                 No data recorded     [place] Coma Scale Score: 15 (01/02/25 1247 : Kiarra Isaac RN)                           Medical Decision Making  HISTORIAN:  Patient    CHART REVIEW:  No pertinent findings    PT SUMMARY:  44-year-old male presenting to ED with chest pain.  Vital signs stable.    DDX:  ACS, pneumonia, pneumothorax, PE, aortic dissection, muscle skeletal pain, gastric related pain      PLAN:  Will obtain CBC, BMP, EKG, troponin, chest x-ray    DISPO/RE-EVAL:  Patient's labs show no significant abnormalities.  Chest x-ray negative.  Low suspicion for ACS as patient has nonischemic EKG and 2 negative high sensitive troponins.  Low suspicion for PE as he is overall low risk and PERC rule negative.  Low suspicion for aortic dissection as his history and physical exam is not consistent with this and chest x-ray is normal.  With workup otherwise negative will discharge patient home.  Recommend following up with primary care.  Advised to come back to ED for any new or worsening symptoms          Procedure  Procedures     Arslan Wiggins DO  01/02/25 160

## 2025-01-02 NOTE — ED TRIAGE NOTES
Pt presents for chest pain and tightness that has been there for a couple days. He states that the tightness is always there and that the pains come and go. The pains are shooting. He has hx of anxiety and states that he thinks it may be that. He has had nauseated and has vomited a few times. He has hx of benign palpitations in which he states he takes a beta blocker for. Denies any diaphoresis.

## 2025-01-09 ENCOUNTER — ANESTHESIA EVENT (OUTPATIENT)
Dept: GASTROENTEROLOGY | Facility: HOSPITAL | Age: 45
End: 2025-01-09
Payer: COMMERCIAL

## 2025-01-13 ENCOUNTER — ANESTHESIA (OUTPATIENT)
Dept: GASTROENTEROLOGY | Facility: HOSPITAL | Age: 45
End: 2025-01-13
Payer: COMMERCIAL

## 2025-01-13 ENCOUNTER — HOSPITAL ENCOUNTER (OUTPATIENT)
Dept: GASTROENTEROLOGY | Facility: HOSPITAL | Age: 45
Discharge: HOME | End: 2025-01-13
Payer: COMMERCIAL

## 2025-01-13 VITALS
WEIGHT: 250 LBS | TEMPERATURE: 97.6 F | HEIGHT: 70 IN | RESPIRATION RATE: 16 BRPM | BODY MASS INDEX: 35.79 KG/M2 | OXYGEN SATURATION: 95 % | HEART RATE: 72 BPM | DIASTOLIC BLOOD PRESSURE: 78 MMHG | SYSTOLIC BLOOD PRESSURE: 117 MMHG

## 2025-01-13 DIAGNOSIS — Z12.11 SCREENING FOR COLON CANCER: ICD-10-CM

## 2025-01-13 PROCEDURE — 3700000001 HC GENERAL ANESTHESIA TIME - INITIAL BASE CHARGE

## 2025-01-13 PROCEDURE — 3700000002 HC GENERAL ANESTHESIA TIME - EACH INCREMENTAL 1 MINUTE

## 2025-01-13 PROCEDURE — 7100000009 HC PHASE TWO TIME - INITIAL BASE CHARGE

## 2025-01-13 PROCEDURE — 45380 COLONOSCOPY AND BIOPSY: CPT | Performed by: INTERNAL MEDICINE

## 2025-01-13 PROCEDURE — 2500000004 HC RX 250 GENERAL PHARMACY W/ HCPCS (ALT 636 FOR OP/ED): Performed by: NURSE ANESTHETIST, CERTIFIED REGISTERED

## 2025-01-13 PROCEDURE — 7100000010 HC PHASE TWO TIME - EACH INCREMENTAL 1 MINUTE

## 2025-01-13 RX ORDER — PROPOFOL 10 MG/ML
INJECTION, EMULSION INTRAVENOUS AS NEEDED
Status: DISCONTINUED | OUTPATIENT
Start: 2025-01-13 | End: 2025-01-13

## 2025-01-13 RX ORDER — SODIUM CHLORIDE 0.9 % (FLUSH) 0.9 %
SYRINGE (ML) INJECTION AS NEEDED
Status: DISCONTINUED | OUTPATIENT
Start: 2025-01-13 | End: 2025-01-13

## 2025-01-13 RX ORDER — FENTANYL CITRATE 50 UG/ML
INJECTION, SOLUTION INTRAMUSCULAR; INTRAVENOUS AS NEEDED
Status: DISCONTINUED | OUTPATIENT
Start: 2025-01-13 | End: 2025-01-13

## 2025-01-13 RX ORDER — LIDOCAINE HYDROCHLORIDE 20 MG/ML
INJECTION, SOLUTION INFILTRATION; PERINEURAL AS NEEDED
Status: DISCONTINUED | OUTPATIENT
Start: 2025-01-13 | End: 2025-01-13

## 2025-01-13 RX ADMIN — FENTANYL CITRATE 25 MCG: 50 INJECTION INTRAMUSCULAR; INTRAVENOUS at 10:14

## 2025-01-13 RX ADMIN — PROPOFOL 50 MG: 10 INJECTION, EMULSION INTRAVENOUS at 10:25

## 2025-01-13 RX ADMIN — FENTANYL CITRATE 50 MCG: 50 INJECTION INTRAMUSCULAR; INTRAVENOUS at 10:12

## 2025-01-13 RX ADMIN — PROPOFOL 50 MG: 10 INJECTION, EMULSION INTRAVENOUS at 10:16

## 2025-01-13 RX ADMIN — Medication 2 ML: at 10:25

## 2025-01-13 RX ADMIN — FENTANYL CITRATE 25 MCG: 50 INJECTION INTRAMUSCULAR; INTRAVENOUS at 10:16

## 2025-01-13 RX ADMIN — PROPOFOL 50 MG: 10 INJECTION, EMULSION INTRAVENOUS at 10:23

## 2025-01-13 RX ADMIN — Medication 2 ML: at 10:14

## 2025-01-13 RX ADMIN — PROPOFOL 50 MG: 10 INJECTION, EMULSION INTRAVENOUS at 10:20

## 2025-01-13 RX ADMIN — PROPOFOL 50 MG: 10 INJECTION, EMULSION INTRAVENOUS at 10:14

## 2025-01-13 RX ADMIN — Medication 2 ML: at 10:20

## 2025-01-13 RX ADMIN — PROPOFOL 100 MG: 10 INJECTION, EMULSION INTRAVENOUS at 10:12

## 2025-01-13 RX ADMIN — Medication 2 ML: at 10:23

## 2025-01-13 RX ADMIN — LIDOCAINE HYDROCHLORIDE 2 ML: 20 INJECTION, SOLUTION INFILTRATION; PERINEURAL at 10:12

## 2025-01-13 RX ADMIN — Medication 2 ML: at 10:18

## 2025-01-13 RX ADMIN — PROPOFOL 50 MG: 10 INJECTION, EMULSION INTRAVENOUS at 10:18

## 2025-01-13 RX ADMIN — Medication 2 ML: at 10:16

## 2025-01-13 RX ADMIN — Medication 2 ML: at 10:12

## 2025-01-13 SDOH — HEALTH STABILITY: MENTAL HEALTH: CURRENT SMOKER: 0

## 2025-01-13 ASSESSMENT — COLUMBIA-SUICIDE SEVERITY RATING SCALE - C-SSRS
6. HAVE YOU EVER DONE ANYTHING, STARTED TO DO ANYTHING, OR PREPARED TO DO ANYTHING TO END YOUR LIFE?: NO
2. HAVE YOU ACTUALLY HAD ANY THOUGHTS OF KILLING YOURSELF?: NO
1. IN THE PAST MONTH, HAVE YOU WISHED YOU WERE DEAD OR WISHED YOU COULD GO TO SLEEP AND NOT WAKE UP?: NO

## 2025-01-13 ASSESSMENT — PAIN - FUNCTIONAL ASSESSMENT
PAIN_FUNCTIONAL_ASSESSMENT: 0-10

## 2025-01-13 ASSESSMENT — PAIN SCALES - GENERAL
PAINLEVEL_OUTOF10: 0 - NO PAIN
PAIN_LEVEL: 0
PAINLEVEL_OUTOF10: 0 - NO PAIN

## 2025-01-13 NOTE — ANESTHESIA PREPROCEDURE EVALUATION
Patient: Eugene Chang    Procedure Information       Anesthesia Start Date/Time: 01/13/25 1005    Scheduled providers: Louis Anna DO    Procedure: COLONOSCOPY    Location: [facility]            Relevant Problems   Anesthesia (within normal limits)      Cardiac   (+) Chest pain   (+) PVC (premature ventricular contraction)      Pulmonary   (+) Asthma   (+) Moderate persistent asthma      Neuro   (+) Anxiety   (+) Depressive disorder   (+) Panic disorder      GI   (+) GERD (gastroesophageal reflux disease)      /Renal (within normal limits)      Liver (within normal limits)      Endocrine   (+) Obesity      Hematology (within normal limits)       Clinical information reviewed:   Tobacco  Allergies  Meds   Med Hx  Surg Hx   Fam Hx  Soc Hx        NPO Detail:  NPO/Void Status  Date of Last Liquid: 01/13/25  Time of Last Liquid: 0530  Date of Last Solid: 01/11/25  Time of Last Solid: 1800  Last Intake Type: GI prep; Solid meal  Time of Last Void: 0940         Physical Exam    Airway  Mallampati: III  TM distance: >3 FB  Neck ROM: full     Cardiovascular - normal exam  Rhythm: regular  Rate: normal     Dental - normal exam     Pulmonary - normal exam     Abdominal - normal exam             Anesthesia Plan    History of general anesthesia?: yes  History of complications of general anesthesia?: no    ASA 2     MAC     The patient is not a current smoker.    intravenous induction   Anesthetic plan and risks discussed with patient.

## 2025-01-13 NOTE — ANESTHESIA POSTPROCEDURE EVALUATION
Patient: Eugene Chang    Procedure Summary       Date: 01/13/25 Room / Location: [facility][1]    Anesthesia Start: 1005 Anesthesia Stop: 1031    Procedure: COLONOSCOPY Diagnosis: Screening for colon cancer    Scheduled Providers: Louis Anna DO Responsible Provider: VIKTOR Encinas    Anesthesia Type: MAC ASA Status: 2            Anesthesia Type: MAC    Vitals Value Taken Time   /78 01/13/25 1051   Temp 36.4 °C (97.6 °F) 01/13/25 1051   Pulse 72 01/13/25 1051   Resp 16 01/13/25 1051   SpO2 95 % 01/13/25 1051       Anesthesia Post Evaluation    Patient location during evaluation: bedside  Patient participation: complete - patient participated  Level of consciousness: awake and alert  Pain score: 0  Pain management: adequate  Airway patency: patent  Cardiovascular status: acceptable and hemodynamically stable  Respiratory status: acceptable  Hydration status: acceptable  Postoperative Nausea and Vomiting: none        There were no known notable events for this encounter.

## 2025-01-13 NOTE — H&P
History Of Present Illness  Eugene Chang is a 44 y.o. male presenting for screening colon.     Past Medical History  Past Medical History:   Diagnosis Date    Allergic Age 5    Anxiety Age 29    Asthma Age 5    Colon polyp     Depression Age30    Personal history of COVID-19     History of COVID-19    Personal history of other diseases of the digestive system 01/16/2019    History of diverticulosis    Personal history of other diseases of the respiratory system     History of asthma    Personal history of other malignant neoplasm of skin 01/16/2019    History of malignant neoplasm of skin    Personal history of other specified conditions 01/16/2019    History of chest pain       Surgical History  Past Surgical History:   Procedure Laterality Date    COLONOSCOPY W/ POLYPECTOMY  2019    OTHER SURGICAL HISTORY  01/17/2019    [place] tooth extraction    OTHER SURGICAL HISTORY  01/08/2020    Cyst excision        Social History  He reports that he quit smoking about 13 years ago. His smoking use included cigarettes. He started smoking about 28 years ago. He has a 15.3 pack-year smoking history. He has been exposed to tobacco smoke. He has never used smokeless tobacco. He reports that he does not currently use alcohol. He reports that he does not use drugs.    Family History  Family History   Problem Relation Name Age of Onset    Ovarian cancer Mother Carol Chang     Diabetes Father JOSE A Chang     Heart disease Father JOSE A Chang     Cancer Maternal Grandmother Renetta Gan     Colon cancer Maternal Grandmother Renetta Gan     Stroke Maternal Grandmother Renetta Gan     Leukemia Paternal Grandfather          Allergies  Amoxicillin-pot clavulanate, Escitalopram oxalate, and Fluoxetine    Review of Systems     Physical Exam  Constitutional:       General: He is awake.      Appearance: Normal appearance.   HENT:      Head: Normocephalic and atraumatic.      Nose: Nose normal.      Mouth/Throat:      Mouth:  Mucous membranes are moist.   Eyes:      Pupils: Pupils are equal, round, and reactive to light.   Neck:      Thyroid: No thyroid mass.      Trachea: Phonation normal.   Cardiovascular:      Rate and Rhythm: Normal rate and regular rhythm.      Heart sounds: Normal heart sounds. No murmur heard.     No gallop.   Pulmonary:      Effort: Pulmonary effort is normal. No respiratory distress.      Breath sounds: Normal air entry. No decreased breath sounds, wheezing, rhonchi or rales.   Abdominal:      General: Bowel sounds are normal. There is no distension.      Palpations: Abdomen is soft.      Tenderness: There is no abdominal tenderness.   Musculoskeletal:      Cervical back: Neck supple.      Right lower leg: No edema.      Left lower leg: No edema.   Skin:     General: Skin is warm.      Capillary Refill: Capillary refill takes less than 2 seconds.   Neurological:      General: No focal deficit present.      Mental Status: He is alert and oriented to person, place, and time. Mental status is at baseline.      Cranial Nerves: Cranial nerves 2-12 are intact.      Motor: Motor function is intact.   Psychiatric:         Attention and Perception: Attention and perception normal.         Mood and Affect: Mood normal.         Speech: Speech normal.         Behavior: Behavior normal.          Last Recorded Vitals  There were no vitals taken for this visit.    Relevant Results        Current Outpatient Medications   Medication Instructions    AirDuo Digihaler 232-14 mcg/actuation inhaler     albuterol 90 mcg/actuation inhaler 2 puffs, inhalation, Every 4 hours PRN    budesonide-formoteroL (Symbicort) 160-4.5 mcg/actuation inhaler 2 puffs, inhalation, 2 times daily RT    colchicine 0.6 mg, oral, 2 times daily    meloxicam (MOBIC) 15 mg, oral, Daily    nebivolol (BYSTOLIC) 5 mg, oral, Daily    omeprazole (PRILOSEC) 40 mg, oral, Daily          Assessment/Plan   Assessment & Plan  Screening for colon cancer      Family history  of colon cancer  Personal history of colon polyps  Colonoscopy for further eval    Risk and benefits of the endoscopic procedure including bleeding perforation and infection were discussed with patient and they wish to proceed                 Louis Anna,

## 2025-01-20 LAB
LABORATORY COMMENT REPORT: NORMAL
PATH REPORT.FINAL DX SPEC: NORMAL
PATH REPORT.GROSS SPEC: NORMAL
PATH REPORT.RELEVANT HX SPEC: NORMAL
PATH REPORT.TOTAL CANCER: NORMAL

## 2025-01-24 DIAGNOSIS — M25.512 LEFT SHOULDER PAIN, UNSPECIFIED CHRONICITY: ICD-10-CM

## 2025-01-24 RX ORDER — MELOXICAM 15 MG/1
15 TABLET ORAL DAILY
Qty: 30 TABLET | Refills: 0 | Status: SHIPPED | OUTPATIENT
Start: 2025-01-24

## 2025-02-07 ENCOUNTER — OFFICE VISIT (OUTPATIENT)
Dept: URGENT CARE | Age: 45
End: 2025-02-07
Payer: COMMERCIAL

## 2025-02-07 VITALS
HEART RATE: 86 BPM | TEMPERATURE: 99.8 F | SYSTOLIC BLOOD PRESSURE: 126 MMHG | OXYGEN SATURATION: 96 % | DIASTOLIC BLOOD PRESSURE: 73 MMHG | RESPIRATION RATE: 18 BRPM

## 2025-02-07 DIAGNOSIS — J06.9 UPPER RESPIRATORY INFECTION WITH COUGH AND CONGESTION: Primary | ICD-10-CM

## 2025-02-07 RX ORDER — PREDNISONE 20 MG/1
40 TABLET ORAL DAILY
Qty: 10 TABLET | Refills: 0 | Status: SHIPPED | OUTPATIENT
Start: 2025-02-07 | End: 2025-02-12

## 2025-02-07 RX ORDER — ALBUTEROL SULFATE 90 UG/1
2 INHALANT RESPIRATORY (INHALATION) EVERY 4 HOURS PRN
Qty: 8 G | Refills: 0 | Status: SHIPPED | OUTPATIENT
Start: 2025-02-07 | End: 2026-02-07

## 2025-02-07 RX ORDER — AZITHROMYCIN 250 MG/1
TABLET, FILM COATED ORAL
Qty: 6 TABLET | Refills: 0 | Status: SHIPPED | OUTPATIENT
Start: 2025-02-07 | End: 2025-02-12

## 2025-02-07 ASSESSMENT — ENCOUNTER SYMPTOMS
EYES NEGATIVE: 1
ACTIVITY CHANGE: 1
COUGH: 1
SHORTNESS OF BREATH: 1

## 2025-02-07 NOTE — PROGRESS NOTES
Subjective   Patient ID: Eugene Chang is a 44 y.o. male. They present today with a chief complaint of cough, congestion x1 week.    History of Present Illness  HPI a 44-year-old male arrives to clinic with chief complaint of cough and congestion.  The patient reports that he started having sinus pressure and drainage that is now into his lungs.  He does have a history of asthma and uses albuterol inhaler once today.  He is here for evaluation.    Past Medical History  Allergies as of 02/07/2025 - Reviewed 02/07/2025   Allergen Reaction Noted    Amoxicillin-pot clavulanate Unknown 10/17/2023    Escitalopram oxalate Unknown 10/17/2023    Fluoxetine Unknown 10/17/2023       (Not in a hospital admission)       Past Medical History:   Diagnosis Date    Allergic Age 5    Anxiety Age 29    Asthma Age 5    Colon polyp     Depression Age30    Personal history of COVID-19     History of COVID-19    Personal history of other diseases of the digestive system 01/16/2019    History of diverticulosis    Personal history of other diseases of the respiratory system     History of asthma    Personal history of other malignant neoplasm of skin 01/16/2019    History of malignant neoplasm of skin    Personal history of other specified conditions 01/16/2019    History of chest pain       Past Surgical History:   Procedure Laterality Date    COLONOSCOPY W/ POLYPECTOMY  2019    OTHER SURGICAL HISTORY  01/17/2019    [place] tooth extraction    OTHER SURGICAL HISTORY  01/08/2020    Cyst excision        reports that he quit smoking about 13 years ago. His smoking use included cigarettes. He started smoking about 28 years ago. He has a 15.3 pack-year smoking history. He has been exposed to tobacco smoke. He has never used smokeless tobacco. He reports that he does not currently use alcohol. He reports that he does not use drugs.    Review of Systems  Review of Systems   Constitutional:  Positive for activity change.   HENT:  Positive for  congestion.    Eyes: Negative.    Respiratory:  Positive for cough and shortness of breath.          Objective    Vitals:    02/07/25 1427   BP: 126/73   Pulse: 86   Resp: 18   Temp: 37.7 °C (99.8 °F)   SpO2: 96%     No LMP for male patient.    Physical Exam  Constitutional:       Appearance: Normal appearance. He is normal weight.   HENT:      Right Ear: Tympanic membrane normal.      Left Ear: Tympanic membrane normal.   Cardiovascular:      Rate and Rhythm: Normal rate and regular rhythm.   Pulmonary:      Breath sounds: Examination of the right-lower field reveals decreased breath sounds and wheezing. Examination of the left-lower field reveals decreased breath sounds and wheezing. Decreased breath sounds and wheezing present.   Neurological:      Mental Status: He is alert.         Procedures    Point of Care Test & Imaging Results from this visit  No results found for this visit on 02/07/25.   No results found.    Diagnostic study results (if any) were reviewed by MIGUEL A Oneill.    Assessment/Plan   Allergies, medications, history, and pertinent labs/EKGs/Imaging reviewed by MIGUEL A Oneill.     Medical Decision Making  Upon initial assessment, the patient was sitting calmly the bedside chair in no acute/respiratory distress.  Mouth and throat exam reveals no swollen tonsils, uvula, tongue, bilateral examination reveals no otitis media, externa, or middle ear effusion respiratory examination does reveal diminished lung sounds with slight wheezes to the left and right lower lobes.  Given his symptoms and history of asthma, reasonable to prescribe an antibiotic.  Azithromycin sent with prednisone.  Follow-up with your primary care provider for CXR if symptoms are still present.    As a result of the work-up, the patient was discharged home.  he was informed of his diagnosis and instructed to come back with any concerns or worsening of condition.  he and was agreeable to the plan as  discussed above.  he was given the opportunity to ask questions.  All of the patient's questions were answered.    This document was generated using the assistance of voice recognition software. If there are any errors of spelling, grammar, syntax, or meaning; please feel free to contact me directly for clarification.     Orders and Diagnoses  Diagnoses and all orders for this visit:  Upper respiratory infection with cough and congestion  -     azithromycin (Zithromax) 250 mg tablet; Take 2 tabs (500 mg) by mouth today, than 1 daily for 4 days.  -     albuterol (Ventolin HFA) 90 mcg/actuation inhaler; Inhale 2 puffs every 4 hours if needed for wheezing or shortness of breath.  -     predniSONE (Deltasone) 20 mg tablet; Take 2 tablets (40 mg) by mouth once daily for 5 days.      Medical Admin Record      Patient disposition: Home    Electronically signed by MIGUEL A Oneill  2:37 PM

## 2025-02-26 DIAGNOSIS — M25.512 LEFT SHOULDER PAIN, UNSPECIFIED CHRONICITY: ICD-10-CM

## 2025-02-26 RX ORDER — MELOXICAM 15 MG/1
15 TABLET ORAL DAILY
Qty: 30 TABLET | Refills: 0 | Status: SHIPPED | OUTPATIENT
Start: 2025-02-26

## 2025-03-21 ENCOUNTER — OFFICE VISIT (OUTPATIENT)
Dept: PRIMARY CARE | Facility: CLINIC | Age: 45
End: 2025-03-21
Payer: COMMERCIAL

## 2025-03-21 VITALS
HEIGHT: 70 IN | SYSTOLIC BLOOD PRESSURE: 129 MMHG | BODY MASS INDEX: 37.65 KG/M2 | DIASTOLIC BLOOD PRESSURE: 82 MMHG | RESPIRATION RATE: 15 BRPM | HEART RATE: 79 BPM | TEMPERATURE: 97.1 F | WEIGHT: 263 LBS | OXYGEN SATURATION: 97 %

## 2025-03-21 DIAGNOSIS — R10.12 LUQ PAIN: Primary | ICD-10-CM

## 2025-03-21 PROCEDURE — 1036F TOBACCO NON-USER: CPT

## 2025-03-21 PROCEDURE — 3008F BODY MASS INDEX DOCD: CPT

## 2025-03-21 PROCEDURE — 99214 OFFICE O/P EST MOD 30 MIN: CPT

## 2025-03-21 SDOH — ECONOMIC STABILITY: FOOD INSECURITY: WITHIN THE PAST 12 MONTHS, YOU WORRIED THAT YOUR FOOD WOULD RUN OUT BEFORE YOU GOT MONEY TO BUY MORE.: NEVER TRUE

## 2025-03-21 SDOH — ECONOMIC STABILITY: FOOD INSECURITY: WITHIN THE PAST 12 MONTHS, THE FOOD YOU BOUGHT JUST DIDN'T LAST AND YOU DIDN'T HAVE MONEY TO GET MORE.: NEVER TRUE

## 2025-03-21 ASSESSMENT — ENCOUNTER SYMPTOMS
PSYCHIATRIC NEGATIVE: 1
CARDIOVASCULAR NEGATIVE: 1
HEMATOLOGIC/LYMPHATIC NEGATIVE: 1
ABDOMINAL PAIN: 1
MUSCULOSKELETAL NEGATIVE: 1
RESPIRATORY NEGATIVE: 1
NEUROLOGICAL NEGATIVE: 1

## 2025-03-21 ASSESSMENT — PATIENT HEALTH QUESTIONNAIRE - PHQ9
2. FEELING DOWN, DEPRESSED OR HOPELESS: NOT AT ALL
1. LITTLE INTEREST OR PLEASURE IN DOING THINGS: NOT AT ALL
SUM OF ALL RESPONSES TO PHQ9 QUESTIONS 1 & 2: 0

## 2025-03-21 ASSESSMENT — ANXIETY QUESTIONNAIRES
4. TROUBLE RELAXING: NOT AT ALL
2. NOT BEING ABLE TO STOP OR CONTROL WORRYING: NOT AT ALL
GAD7 TOTAL SCORE: 0
7. FEELING AFRAID AS IF SOMETHING AWFUL MIGHT HAPPEN: NOT AT ALL
5. BEING SO RESTLESS THAT IT IS HARD TO SIT STILL: NOT AT ALL
IF YOU CHECKED OFF ANY PROBLEMS ON THIS QUESTIONNAIRE, HOW DIFFICULT HAVE THESE PROBLEMS MADE IT FOR YOU TO DO YOUR WORK, TAKE CARE OF THINGS AT HOME, OR GET ALONG WITH OTHER PEOPLE: NOT DIFFICULT AT ALL
1. FEELING NERVOUS, ANXIOUS, OR ON EDGE: NOT AT ALL
3. WORRYING TOO MUCH ABOUT DIFFERENT THINGS: NOT AT ALL
6. BECOMING EASILY ANNOYED OR IRRITABLE: NOT AT ALL

## 2025-03-21 ASSESSMENT — PAIN SCALES - GENERAL: PAINLEVEL_OUTOF10: 0-NO PAIN

## 2025-03-21 ASSESSMENT — LIFESTYLE VARIABLES
HOW OFTEN DO YOU HAVE SIX OR MORE DRINKS ON ONE OCCASION: NEVER
HOW MANY STANDARD DRINKS CONTAINING ALCOHOL DO YOU HAVE ON A TYPICAL DAY: PATIENT DOES NOT DRINK
HOW OFTEN DO YOU HAVE A DRINK CONTAINING ALCOHOL: NEVER
AUDIT-C TOTAL SCORE: 0
SKIP TO QUESTIONS 9-10: 1

## 2025-03-21 NOTE — PROGRESS NOTES
"Subjective   Patient ID: Eugene Chang is a 44 y.o. male who presents for Sick Visit (upper left abdominal pain).    HPI     Eugene presents for left upper abdominal pain. He endorses that this has been going on for about 3 weeks and describes it as a dull pain. He endorses that he has been passing flatus and stool, but has been intermittently constipated- he has been taking dulcolax and his last bowel movement was 3/20.  He endorses that he experienced a similar feeling in 2018 and had a work up and was found to have what he thinks is a splenic nodule- he is agreeable to labs and a ct of his abdomen for further assessment.   He does endorse having some nausea with eating- we discussed following a BRAT diet and increasing his fluid intake. He has been using kratom to help with his anxiety and RLS- unsure whether this is related. He denies any alcohol use.     Review of Systems   HENT: Negative.     Respiratory: Negative.     Cardiovascular: Negative.    Gastrointestinal:  Positive for abdominal pain.   Genitourinary: Negative.    Musculoskeletal: Negative.    Neurological: Negative.    Hematological: Negative.    Psychiatric/Behavioral: Negative.         Objective   /82 (BP Location: Left arm, Patient Position: Sitting, BP Cuff Size: Adult)   Pulse 79   Temp 36.2 °C (97.1 °F) (Temporal)   Resp 15   Ht 1.778 m (5' 10\")   Wt 119 kg (263 lb)   SpO2 97%   BMI 37.74 kg/m²     Physical Exam  Cardiovascular:      Rate and Rhythm: Normal rate and regular rhythm.      Pulses: Normal pulses.      Heart sounds: Normal heart sounds.   Pulmonary:      Effort: Pulmonary effort is normal.      Breath sounds: Normal breath sounds.   Musculoskeletal:         General: Normal range of motion.   Skin:     General: Skin is warm and dry.      Capillary Refill: Capillary refill takes less than 2 seconds.   Neurological:      Mental Status: He is oriented to person, place, and time.   Psychiatric:         Mood and Affect: Mood " normal.         Behavior: Behavior normal.         Thought Content: Thought content normal.         Judgment: Judgment normal.         Assessment/Plan   Problem List Items Addressed This Visit             ICD-10-CM    LUQ pain - Primary R10.12    Relevant Orders    CT abdomen w and wo IV contrast    Lipase    Comprehensive Metabolic Panel

## 2025-03-21 NOTE — PATIENT INSTRUCTIONS
Thank you for coming to see me today.  If you have any questions or concerns following our visit, please contact the office.  Phone: [phone]    Follow up in 6 months    1)  Complete CT scan- I will reach out with results

## 2025-03-24 ENCOUNTER — HOSPITAL ENCOUNTER (OUTPATIENT)
Dept: RADIOLOGY | Facility: CLINIC | Age: 45
Discharge: HOME | End: 2025-03-24
Payer: COMMERCIAL

## 2025-03-24 DIAGNOSIS — K76.0 HEPATIC STEATOSIS: Primary | ICD-10-CM

## 2025-03-24 DIAGNOSIS — K76.0 FATTY LIVER: Primary | ICD-10-CM

## 2025-03-24 DIAGNOSIS — R10.12 LUQ PAIN: ICD-10-CM

## 2025-03-24 LAB
CREAT SERPL-MCNC: 1.1 MG/DL (ref 0.6–1.3)
GFR SERPL CREATININE-BSD FRML MDRD: 84 ML/MIN/1.73M*2

## 2025-03-24 PROCEDURE — 82565 ASSAY OF CREATININE: CPT

## 2025-03-24 PROCEDURE — 2550000001 HC RX 255 CONTRASTS

## 2025-03-24 PROCEDURE — 74160 CT ABDOMEN W/CONTRAST: CPT

## 2025-03-24 RX ADMIN — IOHEXOL 75 ML: 350 INJECTION, SOLUTION INTRAVENOUS at 08:56

## 2025-03-25 LAB
ALBUMIN SERPL-MCNC: 4.7 G/DL (ref 3.6–5.1)
ALP SERPL-CCNC: 61 U/L (ref 36–130)
ALT SERPL-CCNC: 36 U/L (ref 9–46)
ANION GAP SERPL CALCULATED.4IONS-SCNC: 11 MMOL/L (CALC) (ref 7–17)
AST SERPL-CCNC: 19 U/L (ref 10–40)
BILIRUB SERPL-MCNC: 0.6 MG/DL (ref 0.2–1.2)
BUN SERPL-MCNC: 15 MG/DL (ref 7–25)
CALCIUM SERPL-MCNC: 9.4 MG/DL (ref 8.6–10.3)
CHLORIDE SERPL-SCNC: 103 MMOL/L (ref 98–110)
CO2 SERPL-SCNC: 24 MMOL/L (ref 20–32)
CREAT SERPL-MCNC: 0.94 MG/DL (ref 0.6–1.29)
EGFRCR SERPLBLD CKD-EPI 2021: 102 ML/MIN/1.73M2
GLUCOSE SERPL-MCNC: 94 MG/DL (ref 65–139)
LIPASE SERPL-CCNC: 16 U/L (ref 7–60)
POTASSIUM SERPL-SCNC: 4.7 MMOL/L (ref 3.5–5.3)
PROT SERPL-MCNC: 7.2 G/DL (ref 6.1–8.1)
SODIUM SERPL-SCNC: 138 MMOL/L (ref 135–146)

## 2025-04-11 ENCOUNTER — TELEPHONE (OUTPATIENT)
Dept: PULMONOLOGY | Facility: HOSPITAL | Age: 45
End: 2025-04-11
Payer: COMMERCIAL

## 2025-04-11 ENCOUNTER — TELEPHONE (OUTPATIENT)
Dept: PRIMARY CARE | Facility: CLINIC | Age: 45
End: 2025-04-11
Payer: COMMERCIAL

## 2025-04-11 DIAGNOSIS — J45.909 ASTHMA, UNSPECIFIED ASTHMA SEVERITY, UNSPECIFIED WHETHER COMPLICATED, UNSPECIFIED WHETHER PERSISTENT (HHS-HCC): Primary | ICD-10-CM

## 2025-04-11 DIAGNOSIS — M10.9 ACUTE GOUT INVOLVING TOE OF LEFT FOOT, UNSPECIFIED CAUSE: ICD-10-CM

## 2025-04-11 DIAGNOSIS — J45.909 ASTHMA, UNSPECIFIED ASTHMA SEVERITY, UNSPECIFIED WHETHER COMPLICATED, UNSPECIFIED WHETHER PERSISTENT (HHS-HCC): ICD-10-CM

## 2025-04-11 DIAGNOSIS — R00.2 PALPITATIONS: ICD-10-CM

## 2025-04-11 DIAGNOSIS — K21.9 GASTROESOPHAGEAL REFLUX DISEASE, UNSPECIFIED WHETHER ESOPHAGITIS PRESENT: ICD-10-CM

## 2025-04-11 RX ORDER — COLCHICINE 0.6 MG/1
0.6 TABLET ORAL 2 TIMES DAILY
Qty: 180 TABLET | Refills: 1 | Status: CANCELLED | OUTPATIENT
Start: 2025-04-11

## 2025-04-11 RX ORDER — FLUTICASONE FUROATE AND VILANTEROL 200; 25 UG/1; UG/1
1 POWDER RESPIRATORY (INHALATION) DAILY
Qty: 1 EACH | Refills: 11 | Status: SHIPPED | OUTPATIENT
Start: 2025-04-11

## 2025-04-11 RX ORDER — ALBUTEROL SULFATE 90 UG/1
2 INHALANT RESPIRATORY (INHALATION) EVERY 4 HOURS PRN
Qty: 18 G | Refills: 2 | Status: SHIPPED | OUTPATIENT
Start: 2025-04-11

## 2025-04-11 NOTE — TELEPHONE ENCOUNTER
"Called and spoke with patient and let him know that we sent the Breo to his pharmacy. Patient is going to come  [employer] Breo coupon. Instructed him to call us back with any further questions or concerns. Patient was agreeable to treatment plan and acknowledged understanding.     ----- Message from Nae Herndon sent at 4/11/2025 11:22 AM EDT -----  Regarding: RE: Inhaler Change  Okay let me send him Breo, he has done well on this in the past. Then lets try using the \"under insured coupon\". If any trouble we can send it to our pharmacy here.  ----- Message -----  From: Laney Hernandez MA  Sent: 4/11/2025  10:32 AM EDT  To: FIOR Winters-CNP  Subject: Inhaler Change                                   Patient is wondering if we have any coupons for maintenance inhaler since his insurance is so bad. He is currently on Symbicort but it is still pretty expensive. Could he do one of the inhalers that qualify for the [employer] coupon?  "

## 2025-04-11 NOTE — TELEPHONE ENCOUNTER
Previous Gely Patient states all medications were supposed to be sent to [facility] in [place] on last visit nothing was moved. Is scheduled to see Tabitha next.

## 2025-04-13 RX ORDER — NEBIVOLOL 5 MG/1
5 TABLET ORAL DAILY
Qty: 90 TABLET | Refills: 1 | Status: SHIPPED | OUTPATIENT
Start: 2025-04-13

## 2025-04-13 RX ORDER — OMEPRAZOLE 40 MG/1
40 CAPSULE, DELAYED RELEASE ORAL DAILY
Qty: 90 CAPSULE | Refills: 1 | Status: SHIPPED | OUTPATIENT
Start: 2025-04-13

## 2025-04-18 DIAGNOSIS — K21.9 GASTROESOPHAGEAL REFLUX DISEASE, UNSPECIFIED WHETHER ESOPHAGITIS PRESENT: ICD-10-CM

## 2025-04-22 ENCOUNTER — TELEPHONE (OUTPATIENT)
Dept: ORTHOPEDIC SURGERY | Facility: HOSPITAL | Age: 45
End: 2025-04-22
Payer: COMMERCIAL

## 2025-04-22 DIAGNOSIS — M22.42 CHONDROMALACIA OF LEFT PATELLA: Primary | ICD-10-CM

## 2025-04-22 DIAGNOSIS — M75.42 ROTATOR CUFF IMPINGEMENT SYNDROME, LEFT: ICD-10-CM

## 2025-04-22 RX ORDER — MELOXICAM 15 MG/1
15 TABLET ORAL DAILY
Qty: 30 TABLET | Refills: 1 | Status: SHIPPED | OUTPATIENT
Start: 2025-04-22 | End: 2025-06-21

## 2025-04-23 RX ORDER — OMEPRAZOLE 40 MG/1
40 CAPSULE, DELAYED RELEASE ORAL DAILY
Qty: 90 CAPSULE | Refills: 1 | Status: SHIPPED | OUTPATIENT
Start: 2025-04-23

## 2025-05-13 ENCOUNTER — APPOINTMENT (OUTPATIENT)
Facility: CLINIC | Age: 45
End: 2025-05-13
Payer: COMMERCIAL

## 2025-05-13 VITALS
WEIGHT: 257 LBS | HEART RATE: 82 BPM | BODY MASS INDEX: 36.79 KG/M2 | OXYGEN SATURATION: 95 % | DIASTOLIC BLOOD PRESSURE: 79 MMHG | HEIGHT: 70 IN | SYSTOLIC BLOOD PRESSURE: 118 MMHG

## 2025-05-13 DIAGNOSIS — R10.12 LUQ PAIN: ICD-10-CM

## 2025-05-13 DIAGNOSIS — K76.0 HEPATIC STEATOSIS: ICD-10-CM

## 2025-05-13 DIAGNOSIS — R10.13 EPIGASTRIC PAIN: Primary | ICD-10-CM

## 2025-05-13 PROCEDURE — 1036F TOBACCO NON-USER: CPT | Performed by: NURSE PRACTITIONER

## 2025-05-13 PROCEDURE — 99215 OFFICE O/P EST HI 40 MIN: CPT | Performed by: NURSE PRACTITIONER

## 2025-05-13 PROCEDURE — 3008F BODY MASS INDEX DOCD: CPT | Performed by: NURSE PRACTITIONER

## 2025-05-13 ASSESSMENT — ENCOUNTER SYMPTOMS
ARTHRALGIAS: 0
CONFUSION: 0
WEAKNESS: 0
DIZZINESS: 0
DIFFICULTY URINATING: 0
FEVER: 0
SHORTNESS OF BREATH: 0
BRUISES/BLEEDS EASILY: 0
PALPITATIONS: 0
CHILLS: 0
SORE THROAT: 0
COUGH: 0
ROS GI COMMENTS: SEE HPI
WOUND: 0
TROUBLE SWALLOWING: 0
ADENOPATHY: 0
JOINT SWELLING: 0

## 2025-05-13 NOTE — H&P (VIEW-ONLY)
Subjective   Patient ID: Eugene Chang is a 44 y.o. male with PMH of obesity, diverticulosis, GERD, anxiety, depression, asthma who was referred by Jewels Salgado* for Abdominal Pain.     Patient's PCP is No primary care provider on file.    HPI  Patient referred by PCP for fatty liver seen on a CT abd with IV contrast. The CT was ordered for LUQ pain.     Patient reports epigastric and LUQ pain for months. Pain is usually dull and always present, but it can get sharp at time. He reports having this pain in 2018 at one point and it improved with weight loss; after gaining weight back the pain returned. Recent imaging showed splenomegaly but no pancreatic findings. 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 He is currently on omeprazole 40mg daily and has been on this for years.     He recently had imaging that showed fatty liver. His Hgb A1c has been normal. LDL elevated on lipid panel last year but otherwise lipid panel was normal. He does not drink much alcohol. He at one point had lost significant weight but gained it back over the last few years.       Summary of endoscopies:  - Colonoscopy 1/2025: IC valve and cecum normal. One 3mm polyp (HP) in transverse colon. Diverticulosis. Internal small flat hemorrhoids     Social Hx:  Tobacco: former; quit 2011. Smoked x15 years   E-cigarette: daily   Etoh: rare   Recreational drug use: none  NSAIDs: none      Family Hx:  Grandmother -- colon cancer   No IBD or pancreatitis     Review of Systems:  Review of Systems   Constitutional:  Negative for chills and fever.   HENT:  Negative for sore throat and trouble swallowing.    Respiratory:  Negative for cough and shortness of breath.    Cardiovascular:  Negative for chest pain and palpitations.   Gastrointestinal:         SEE HPI   Endocrine: Negative for cold  intolerance and heat intolerance.   Genitourinary:  Negative for difficulty urinating.   Musculoskeletal:  Negative for arthralgias and joint swelling.   Skin:  Negative for rash and wound.   Neurological:  Negative for dizziness and weakness.   Hematological:  Negative for adenopathy. Does not bruise/bleed easily.   Psychiatric/Behavioral:  Negative for confusion.         Medications:  Prior to Admission medications    Medication Sig Start Date End Date Taking? Authorizing Provider   AirDuo Digihaler 232-14 mcg/actuation inhaler  3/13/23   Historical Provider, MD   albuterol (Ventolin HFA) 90 mcg/actuation inhaler Inhale 2 puffs every 4 hours if needed for wheezing or shortness of breath. 2/7/25 2/7/26  FIOR Oneill-CNP   albuterol 90 mcg/actuation inhaler Inhale 2 puffs every 4 hours if needed for wheezing or shortness of breath. 4/11/25   FIOR Winters-CNP   colchicine 0.6 mg tablet Take 1 tablet (0.6 mg) by mouth 2 times a day. 8/8/24   FIOR Hernandez-CNP   fluticasone furoate-vilanteroL (Breo Ellipta) 200-25 mcg/dose inhaler Inhale 1 puff once daily. 4/11/25   MIGUEL A Winters   meloxicam (Mobic) 15 mg tablet Take 1 tablet (15 mg) by mouth once daily. 2/26/25   Gabrielle Lopresti, PA-C   meloxicam (Mobic) 15 mg tablet Take 1 tablet (15 mg) by mouth once daily. 4/22/25 6/21/25  Gabrielle Lopresti, PA-C   nebivolol (Bystolic) 5 mg tablet Take 1 tablet (5 mg) by mouth once daily. 4/13/25   FIOR Carter-CNS   omeprazole (PriLOSEC) 40 mg DR capsule TAKE 1 CAPSULE BY MOUTH ONCE DAILY 4/23/25   FIOR Carter-CNS       Allergies:  Amoxicillin-pot clavulanate, Escitalopram oxalate, and Fluoxetine    Past Medical History:  He has a past medical history of Allergic (Age 5), Anxiety (Age 29), Asthma (Age 5), Colon polyp, Depression (Age30), Personal history of COVID-19, Personal history of other diseases of the digestive system (01/16/2019), Personal history  of other diseases of the respiratory system, Personal history of other malignant neoplasm of skin (01/16/2019), and Personal history of other specified conditions (01/16/2019).    Past Surgical History:  He has a past surgical history that includes Other surgical history (01/17/2019); Other surgical history (01/08/2020); and Colonoscopy w/ polypectomy (2019).    Social History:  He reports that he quit smoking about 13 years ago. His smoking use included cigarettes. He started smoking about 28 years ago. He has a 15.3 pack-year smoking history. He has been exposed to tobacco smoke. He has never used smokeless tobacco. He reports that he does not currently use alcohol. He reports that he does not use drugs.    Objective   Physical exam:  Physical Exam  Constitutional:       General: He is not in acute distress.     Appearance: Normal appearance.   HENT:      Mouth/Throat:      Mouth: Mucous membranes are moist.      Comments: pink  Eyes:      Conjunctiva/sclera: Conjunctivae normal.      Pupils: Pupils are equal, round, and reactive to light.   Cardiovascular:      Rate and Rhythm: Normal rate and regular rhythm.      Heart sounds: No murmur heard.  Pulmonary:      Effort: Pulmonary effort is normal.      Breath sounds: Normal breath sounds.   Abdominal:      General: Bowel sounds are normal. There is no distension.      Palpations: Abdomen is soft.      Tenderness: There is no abdominal tenderness. There is no guarding.   Skin:     General: Skin is warm and dry.      Coloration: Skin is not jaundiced.   Neurological:      Mental Status: He is alert and oriented to person, place, and time.   Psychiatric:         Mood and Affect: Mood normal.         Behavior: Behavior normal.          Assessment/Plan     Epigastric, LUQ pain   Ongoing for months and previously had similar pain years ago. At one point imaging showed concern for celiac and mesenteric stenosis but then repeat mesenteric US in 2020 showed no stenosis.  With return of pain and possible history of mesenteric/celiac artery stenosis, will repeat mesenteric US. Has been on PPI for years and will schedule for EGD to r/o PUD, severe gastritis, etc. Possible that pain is MSK in nature.     Hepatic steatosis   Discussed weight loss today to improve fatty liver. Patient actively working on weight loss and has lost 5 lbs so far. Pt does not drink alcohol, glucose and triglycerides have been controlled. Discussed that 25% of people with fatty liver may go on to develop cirrhosis. Will check LFTs and fibroscan.       Follow up after testing       43 minutes spent in total care of patient        Sahara Escobar, APRN-CNP

## 2025-05-13 NOTE — PATIENT INSTRUCTIONS
Thank you for coming to your appointment today   - please do your blood work in the outpatient lab to check your liver enzymes  - schedule your fibroscan; call [phone] to schedule (this is for fatty liver)   - schedule your mesenteric ultrasound; call [phone] (this is for possible history of mesenteric stenosis and abdominal pain)  - we will schedule you for an EGD in the endoscopy center to evaluate your abdominal pain   - follow up after testing     Please call [phone] with any questions or concerns       If you utilize [employer] messages, please understand that these are intended to be used for simple and straightforward medical questions. If you have a more complex question or numerous complaints, an office appointment may be needed.

## 2025-05-13 NOTE — PROGRESS NOTES
Subjective   Patient ID: Eugene Chang is a 44 y.o. male with PMH of obesity, diverticulosis, GERD, anxiety, depression, asthma who was referred by Jewels Salgado* for Abdominal Pain.     Patient's PCP is No primary care provider on file.    HPI  Patient referred by PCP for fatty liver seen on a CT abd with IV contrast. The CT was ordered for LUQ pain.     Patient reports epigastric and LUQ pain for months. Pain is usually dull and always present, but it can get sharp at time. He reports having this pain in 2018 at one point and it improved with weight loss; after gaining weight back the pain returned. Recent imaging showed splenomegaly but no pancreatic findings. 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 He is currently on omeprazole 40mg daily and has been on this for years.     He recently had imaging that showed fatty liver. His Hgb A1c has been normal. LDL elevated on lipid panel last year but otherwise lipid panel was normal. He does not drink much alcohol. He at one point had lost significant weight but gained it back over the last few years.       Summary of endoscopies:  - Colonoscopy 1/2025: IC valve and cecum normal. One 3mm polyp (HP) in transverse colon. Diverticulosis. Internal small flat hemorrhoids     Social Hx:  Tobacco: former; quit 2011. Smoked x15 years   E-cigarette: daily   Etoh: rare   Recreational drug use: none  NSAIDs: none      Family Hx:  Grandmother -- colon cancer   No IBD or pancreatitis     Review of Systems:  Review of Systems   Constitutional:  Negative for chills and fever.   HENT:  Negative for sore throat and trouble swallowing.    Respiratory:  Negative for cough and shortness of breath.    Cardiovascular:  Negative for chest pain and palpitations.   Gastrointestinal:         SEE HPI   Endocrine: Negative for cold  intolerance and heat intolerance.   Genitourinary:  Negative for difficulty urinating.   Musculoskeletal:  Negative for arthralgias and joint swelling.   Skin:  Negative for rash and wound.   Neurological:  Negative for dizziness and weakness.   Hematological:  Negative for adenopathy. Does not bruise/bleed easily.   Psychiatric/Behavioral:  Negative for confusion.         Medications:  Prior to Admission medications    Medication Sig Start Date End Date Taking? Authorizing Provider   AirDuo Digihaler 232-14 mcg/actuation inhaler  3/13/23   Historical Provider, MD   albuterol (Ventolin HFA) 90 mcg/actuation inhaler Inhale 2 puffs every 4 hours if needed for wheezing or shortness of breath. 2/7/25 2/7/26  FIOR Oneill-CNP   albuterol 90 mcg/actuation inhaler Inhale 2 puffs every 4 hours if needed for wheezing or shortness of breath. 4/11/25   FIOR Winters-CNP   colchicine 0.6 mg tablet Take 1 tablet (0.6 mg) by mouth 2 times a day. 8/8/24   FIOR Hernandez-CNP   fluticasone furoate-vilanteroL (Breo Ellipta) 200-25 mcg/dose inhaler Inhale 1 puff once daily. 4/11/25   MIGUEL A Winters   meloxicam (Mobic) 15 mg tablet Take 1 tablet (15 mg) by mouth once daily. 2/26/25   Gabrielle Lopresti, PA-C   meloxicam (Mobic) 15 mg tablet Take 1 tablet (15 mg) by mouth once daily. 4/22/25 6/21/25  Gabrielle Lopresti, PA-C   nebivolol (Bystolic) 5 mg tablet Take 1 tablet (5 mg) by mouth once daily. 4/13/25   FIOR Carter-CNS   omeprazole (PriLOSEC) 40 mg DR capsule TAKE 1 CAPSULE BY MOUTH ONCE DAILY 4/23/25   FIOR Carter-CNS       Allergies:  Amoxicillin-pot clavulanate, Escitalopram oxalate, and Fluoxetine    Past Medical History:  He has a past medical history of Allergic (Age 5), Anxiety (Age 29), Asthma (Age 5), Colon polyp, Depression (Age28), Personal history of COVID-19, Personal history of other diseases of the digestive system (01/16/2019), Personal history  of other diseases of the respiratory system, Personal history of other malignant neoplasm of skin (01/16/2019), and Personal history of other specified conditions (01/16/2019).    Past Surgical History:  He has a past surgical history that includes Other surgical history (01/17/2019); Other surgical history (01/08/2020); and Colonoscopy w/ polypectomy (2019).    Social History:  He reports that he quit smoking about 13 years ago. His smoking use included cigarettes. He started smoking about 28 years ago. He has a 15.3 pack-year smoking history. He has been exposed to tobacco smoke. He has never used smokeless tobacco. He reports that he does not currently use alcohol. He reports that he does not use drugs.    Objective   Physical exam:  Physical Exam  Constitutional:       General: He is not in acute distress.     Appearance: Normal appearance.   HENT:      Mouth/Throat:      Mouth: Mucous membranes are moist.      Comments: pink  Eyes:      Conjunctiva/sclera: Conjunctivae normal.      Pupils: Pupils are equal, round, and reactive to light.   Cardiovascular:      Rate and Rhythm: Normal rate and regular rhythm.      Heart sounds: No murmur heard.  Pulmonary:      Effort: Pulmonary effort is normal.      Breath sounds: Normal breath sounds.   Abdominal:      General: Bowel sounds are normal. There is no distension.      Palpations: Abdomen is soft.      Tenderness: There is no abdominal tenderness. There is no guarding.   Skin:     General: Skin is warm and dry.      Coloration: Skin is not jaundiced.   Neurological:      Mental Status: He is alert and oriented to person, place, and time.   Psychiatric:         Mood and Affect: Mood normal.         Behavior: Behavior normal.          Assessment/Plan     Epigastric, LUQ pain   Ongoing for months and previously had similar pain years ago. At one point imaging showed concern for celiac and mesenteric stenosis but then repeat mesenteric US in 2020 showed no stenosis.  With return of pain and possible history of mesenteric/celiac artery stenosis, will repeat mesenteric US. Has been on PPI for years and will schedule for EGD to r/o PUD, severe gastritis, etc. Possible that pain is MSK in nature.     Hepatic steatosis   Discussed weight loss today to improve fatty liver. Patient actively working on weight loss and has lost 5 lbs so far. Pt does not drink alcohol, glucose and triglycerides have been controlled. Discussed that 25% of people with fatty liver may go on to develop cirrhosis. Will check LFTs and fibroscan.       Follow up after testing       43 minutes spent in total care of patient        Sahara Escobar, APRN-CNP

## 2025-05-22 ENCOUNTER — ANESTHESIA EVENT (OUTPATIENT)
Dept: GASTROENTEROLOGY | Facility: HOSPITAL | Age: 45
End: 2025-05-22
Payer: COMMERCIAL

## 2025-05-27 ENCOUNTER — HOSPITAL ENCOUNTER (OUTPATIENT)
Dept: GASTROENTEROLOGY | Facility: HOSPITAL | Age: 45
Discharge: HOME | End: 2025-05-27
Payer: COMMERCIAL

## 2025-05-27 ENCOUNTER — ANESTHESIA (OUTPATIENT)
Dept: GASTROENTEROLOGY | Facility: HOSPITAL | Age: 45
End: 2025-05-27
Payer: COMMERCIAL

## 2025-05-27 VITALS
HEART RATE: 85 BPM | SYSTOLIC BLOOD PRESSURE: 135 MMHG | OXYGEN SATURATION: 95 % | TEMPERATURE: 97.9 F | RESPIRATION RATE: 16 BRPM | DIASTOLIC BLOOD PRESSURE: 76 MMHG

## 2025-05-27 DIAGNOSIS — R10.12 LUQ PAIN: ICD-10-CM

## 2025-05-27 DIAGNOSIS — R10.13 EPIGASTRIC PAIN: ICD-10-CM

## 2025-05-27 PROCEDURE — 7100000010 HC PHASE TWO TIME - EACH INCREMENTAL 1 MINUTE

## 2025-05-27 PROCEDURE — 2500000004 HC RX 250 GENERAL PHARMACY W/ HCPCS (ALT 636 FOR OP/ED): Mod: JZ | Performed by: INTERNAL MEDICINE

## 2025-05-27 PROCEDURE — 3700000001 HC GENERAL ANESTHESIA TIME - INITIAL BASE CHARGE

## 2025-05-27 PROCEDURE — 43239 EGD BIOPSY SINGLE/MULTIPLE: CPT | Performed by: INTERNAL MEDICINE

## 2025-05-27 PROCEDURE — 3700000002 HC GENERAL ANESTHESIA TIME - EACH INCREMENTAL 1 MINUTE

## 2025-05-27 PROCEDURE — 7100000009 HC PHASE TWO TIME - INITIAL BASE CHARGE

## 2025-05-27 PROCEDURE — 2500000004 HC RX 250 GENERAL PHARMACY W/ HCPCS (ALT 636 FOR OP/ED): Mod: JZ | Performed by: NURSE ANESTHETIST, CERTIFIED REGISTERED

## 2025-05-27 RX ORDER — PROPOFOL 10 MG/ML
INJECTION, EMULSION INTRAVENOUS AS NEEDED
Status: DISCONTINUED | OUTPATIENT
Start: 2025-05-27 | End: 2025-05-27

## 2025-05-27 RX ORDER — SODIUM CHLORIDE 9 MG/ML
20 INJECTION, SOLUTION INTRAVENOUS CONTINUOUS
Status: ACTIVE | OUTPATIENT
Start: 2025-05-27 | End: 2025-05-27

## 2025-05-27 RX ORDER — FENTANYL CITRATE 50 UG/ML
INJECTION, SOLUTION INTRAMUSCULAR; INTRAVENOUS AS NEEDED
Status: DISCONTINUED | OUTPATIENT
Start: 2025-05-27 | End: 2025-05-27

## 2025-05-27 RX ORDER — LIDOCAINE HYDROCHLORIDE 20 MG/ML
INJECTION, SOLUTION EPIDURAL; INFILTRATION; INTRACAUDAL; PERINEURAL AS NEEDED
Status: DISCONTINUED | OUTPATIENT
Start: 2025-05-27 | End: 2025-05-27

## 2025-05-27 RX ADMIN — FENTANYL CITRATE 50 MCG: 50 INJECTION INTRAMUSCULAR; INTRAVENOUS at 07:26

## 2025-05-27 RX ADMIN — PROPOFOL 50 MG: 10 INJECTION, EMULSION INTRAVENOUS at 07:28

## 2025-05-27 RX ADMIN — PROPOFOL 50 MG: 10 INJECTION, EMULSION INTRAVENOUS at 07:30

## 2025-05-27 RX ADMIN — SODIUM CHLORIDE 20 ML/HR: 0.9 INJECTION, SOLUTION INTRAVENOUS at 06:59

## 2025-05-27 RX ADMIN — FENTANYL CITRATE 25 MCG: 50 INJECTION INTRAMUSCULAR; INTRAVENOUS at 07:30

## 2025-05-27 RX ADMIN — FENTANYL CITRATE 25 MCG: 50 INJECTION INTRAMUSCULAR; INTRAVENOUS at 07:28

## 2025-05-27 RX ADMIN — PROPOFOL 100 MG: 10 INJECTION, EMULSION INTRAVENOUS at 07:26

## 2025-05-27 RX ADMIN — LIDOCAINE HYDROCHLORIDE 40 MG: 20 INJECTION, SOLUTION EPIDURAL; INFILTRATION; INTRACAUDAL; PERINEURAL at 07:26

## 2025-05-27 SDOH — HEALTH STABILITY: MENTAL HEALTH: CURRENT SMOKER: 1

## 2025-05-27 ASSESSMENT — PAIN SCALES - GENERAL
PAIN_LEVEL: 0
PAINLEVEL_OUTOF10: 0 - NO PAIN
PAINLEVEL_OUTOF10: 0 - NO PAIN
PAINLEVEL_OUTOF10: 1
PAINLEVEL_OUTOF10: 0 - NO PAIN

## 2025-05-27 ASSESSMENT — COLUMBIA-SUICIDE SEVERITY RATING SCALE - C-SSRS
6. HAVE YOU EVER DONE ANYTHING, STARTED TO DO ANYTHING, OR PREPARED TO DO ANYTHING TO END YOUR LIFE?: NO
1. IN THE PAST MONTH, HAVE YOU WISHED YOU WERE DEAD OR WISHED YOU COULD GO TO SLEEP AND NOT WAKE UP?: NO
2. HAVE YOU ACTUALLY HAD ANY THOUGHTS OF KILLING YOURSELF?: NO

## 2025-05-27 ASSESSMENT — PAIN - FUNCTIONAL ASSESSMENT: PAIN_FUNCTIONAL_ASSESSMENT: 0-10

## 2025-05-27 NOTE — ANESTHESIA PREPROCEDURE EVALUATION
Patient: Eugene Chang    Procedure Information       Date/Time: 05/27/25 0730    Scheduled providers: Louis Anna DO    Procedure: EGD    Location:  [facility]            Relevant Problems   Anesthesia (within normal limits)      Cardiac   (+) Chest pain   (+) PVC (premature ventricular contraction)      Pulmonary   (+) Asthma   (+) Moderate persistent asthma      Neuro   (+) Anxiety   (+) Depressive disorder   (+) Panic disorder      GI   (+) GERD (gastroesophageal reflux disease)      /Renal (within normal limits)      Liver (within normal limits)      Endocrine   (+) Obesity      Hematology (within normal limits)       Clinical information reviewed:   Tobacco  Allergies  Meds   Med Hx  Surg Hx   Fam Hx  Soc Hx        NPO Detail:  NPO/Void Status  Carbohydrate Drink Given Prior to Surgery? : Y  Date of Last Liquid: 05/27/25  Time of Last Liquid: 0000  Date of Last Solid: 05/26/25  Time of Last Solid: 1900  Last Intake Type: Clear fluids  Time of Last Void: 0642         Physical Exam    Airway  Mallampati: III  TM distance: >3 FB  Neck ROM: full  Mouth opening: 3 or more finger widths     Cardiovascular - normal exam  Rhythm: regular  Rate: normal     Dental - normal exam     Pulmonary - normal exam   Abdominal - normal exam           Anesthesia Plan    History of general anesthesia?: yes  History of complications of general anesthesia?: no    ASA 2     MAC     The patient is a current smoker.  Patient was previously instructed to abstain from smoking on day of procedure.  Patient smoked on day of procedure.    intravenous induction   Anesthetic plan and risks discussed with patient.

## 2025-05-27 NOTE — ANESTHESIA POSTPROCEDURE EVALUATION
Patient: Eugene Chang    Procedure Summary       Date: 05/27/25 Room / Location: [facility]    Anesthesia Start: 0722 Anesthesia Stop: 0737    Procedure: EGD Diagnosis:       Epigastric pain      LUQ pain    Scheduled Providers: Louis Anna DO Responsible Provider: VIKTOR Encinas    Anesthesia Type: MAC ASA Status: 2            Anesthesia Type: MAC    Vitals Value Taken Time   /76 05/27/25 07:56   Temp 36.6 °C (97.9 °F) 05/27/25 07:56   Pulse 85 05/27/25 07:56   Resp 16 05/27/25 07:56   SpO2 95 % 05/27/25 07:56       Anesthesia Post Evaluation    Patient location during evaluation: bedside  Patient participation: complete - patient participated  Level of consciousness: awake and alert  Pain score: 0  Pain management: adequate  Airway patency: patent  Cardiovascular status: stable  Respiratory status: acceptable  Hydration status: acceptable  Postoperative Nausea and Vomiting: none        There were no known notable events for this encounter.

## 2025-06-13 ENCOUNTER — HOSPITAL ENCOUNTER (OUTPATIENT)
Dept: VASCULAR MEDICINE | Facility: HOSPITAL | Age: 45
Discharge: HOME | End: 2025-06-13
Payer: COMMERCIAL

## 2025-06-13 DIAGNOSIS — R10.13 EPIGASTRIC PAIN: ICD-10-CM

## 2025-06-13 DIAGNOSIS — R10.30 LOWER ABDOMINAL PAIN, UNSPECIFIED: ICD-10-CM

## 2025-06-13 DIAGNOSIS — R10.12 LUQ PAIN: ICD-10-CM

## 2025-06-13 PROCEDURE — 93975 VASCULAR STUDY: CPT | Performed by: INTERNAL MEDICINE

## 2025-06-13 PROCEDURE — 93975 VASCULAR STUDY: CPT

## 2025-06-16 ENCOUNTER — CLINICAL SUPPORT (OUTPATIENT)
Dept: GASTROENTEROLOGY | Facility: CLINIC | Age: 45
End: 2025-06-16
Payer: COMMERCIAL

## 2025-06-16 DIAGNOSIS — K76.0 HEPATIC STEATOSIS: ICD-10-CM

## 2025-06-18 ENCOUNTER — OFFICE VISIT (OUTPATIENT)
Dept: URGENT CARE | Age: 45
End: 2025-06-18
Payer: COMMERCIAL

## 2025-06-18 VITALS
HEART RATE: 79 BPM | DIASTOLIC BLOOD PRESSURE: 80 MMHG | SYSTOLIC BLOOD PRESSURE: 122 MMHG | RESPIRATION RATE: 18 BRPM | OXYGEN SATURATION: 97 % | TEMPERATURE: 98.3 F

## 2025-06-18 DIAGNOSIS — M10.9 ACUTE GOUT OF LEFT FOOT, UNSPECIFIED CAUSE: Primary | ICD-10-CM

## 2025-06-18 RX ORDER — PREDNISONE 20 MG/1
40 TABLET ORAL DAILY
Qty: 10 TABLET | Refills: 0 | Status: SHIPPED | OUTPATIENT
Start: 2025-06-18 | End: 2025-06-23

## 2025-06-18 ASSESSMENT — ENCOUNTER SYMPTOMS
WOUND: 0
ARTHRALGIAS: 1
NUMBNESS: 0
FEVER: 0
CHILLS: 0
JOINT SWELLING: 1

## 2025-06-18 NOTE — PROGRESS NOTES
Subjective   Patient ID: Eugene Chang is a 44 y.o. male. They present today with a chief complaint of Gout (Pt advised that he has had a Gout Flair up for the past 3 days. Pt also advised that he is taking Colchicine. -WW).    History of Present Illness  Patient is a 44-year-old male who presents today for acute gout flareup of left foot at first MP joint.  He notes that this began on Sunday.  He states he did eat at a seafood boil which may have precipitated his symptoms.  Last flareup was last August.  Denies any injury to foot.  Denies any fevers, chills.  He did start taking his colchicine on Sunday which has been slightly helpful at times though has had a lot of breakthrough pain.  He states this is the worst flareup he has ever had.  He is not on allopurinol at this time.  He does not take meloxicam at this time.          Past Medical History  Allergies as of 06/18/2025 - Reviewed 06/18/2025   Allergen Reaction Noted    Amoxicillin-pot clavulanate Unknown 10/17/2023    Escitalopram oxalate Unknown 10/17/2023    Fluoxetine Unknown 10/17/2023       Prescriptions Prior to Admission[1]     Medical History[2]    Surgical History[3]     reports that he quit smoking about 13 years ago. His smoking use included cigarettes. He started smoking about 29 years ago. He has a 15.3 pack-year smoking history. He has been exposed to tobacco smoke. He has never used smokeless tobacco. He reports that he does not currently use alcohol. He reports that he does not use drugs.    Review of Systems  Review of Systems   Constitutional:  Negative for chills and fever.   Musculoskeletal:  Positive for arthralgias (Left foot) and joint swelling.   Skin:  Negative for wound.   Neurological:  Negative for numbness.                                  Objective    Vitals:    06/18/25 1002   BP: 122/80   Pulse: 79   Resp: 18   Temp: 36.8 °C (98.3 °F)   SpO2: 97%     No LMP for male patient.    Physical Exam  Vitals reviewed.   Constitutional:        General: He is not in acute distress.     Appearance: He is not ill-appearing.   Musculoskeletal:      Comments: Left foot: There is mild redness and swelling at first MP joint.  This is tender to palpation.  He has pain with flexion of toes though does have full range of motion.   Skin:     General: Skin is warm.      Capillary Refill: Capillary refill takes less than 2 seconds.      Findings: No signs of injury or rash.   Neurological:      Mental Status: He is alert.      Sensory: Sensation is intact.      Comments: Gait is antalgic         Procedures    Point of Care Test & Imaging Results from this visit  No results found for this visit on 06/18/25.   Imaging  No results found.    Cardiology, Vascular, and Other Imaging  No other imaging results found for the past 2 days      Diagnostic study results (if any) were reviewed by Dorys Awad PA-C.    Assessment/Plan   Allergies, medications, history, and pertinent labs/EKGs/Imaging reviewed by Dorys Awad PA-C.     Medical Decision Making   MDM- Signs, symptoms, history, and examination consistent with acute gouty arthritis. No evidence of cellulitis or septic arthritis. Will treat with prednisone, patient was advised to avoid combining with NSAID type medicine.. Otherwise supportive measures for pain. Return to clinic or present to ED if symptoms change or worsen.   Patient was advised follow-up with PCP to have uric acid level checked and possible initiation of allopurinol as needed.  Patient verbalized understanding and agrees with plan.      Orders and Diagnoses  Diagnoses and all orders for this visit:  Acute gout of left foot, unspecified cause  -     predniSONE (Deltasone) 20 mg tablet; Take 2 tablets (40 mg) by mouth once daily for 5 days.      Medical Admin Record      Patient disposition: Home    Electronically signed by Dorys Awad PA-C  10:18 AM           [1] (Not in a hospital admission)   [2]   Past Medical History:  Diagnosis Date     Allergic Age 5    Anxiety Age 29    Asthma Age 5    Colon polyp     Depression Age28    Personal history of COVID-19     History of COVID-19    Personal history of other diseases of the digestive system 01/16/2019    History of diverticulosis    Personal history of other diseases of the respiratory system     History of asthma    Personal history of other malignant neoplasm of skin 01/16/2019    History of malignant neoplasm of skin    Personal history of other specified conditions 01/16/2019    History of chest pain   [3]   Past Surgical History:  Procedure Laterality Date    COLONOSCOPY W/ POLYPECTOMY  2019    OTHER SURGICAL HISTORY  01/17/2019    [place] tooth extraction    OTHER SURGICAL HISTORY  01/08/2020    Cyst excision

## 2025-07-15 DIAGNOSIS — M10.9 ACUTE GOUT INVOLVING TOE OF LEFT FOOT, UNSPECIFIED CAUSE: ICD-10-CM

## 2025-07-15 RX ORDER — COLCHICINE 0.6 MG/1
0.6 TABLET ORAL 2 TIMES DAILY
Qty: 180 TABLET | Refills: 0 | Status: SHIPPED | OUTPATIENT
Start: 2025-07-15

## 2025-07-15 NOTE — TELEPHONE ENCOUNTER
Patient requesting refill Colchicine 0.6 mg tablet  BERTA capone/BIGG Salgado   NOV 8/18/2025-MM    [facility]

## 2025-07-22 ENCOUNTER — OFFICE VISIT (OUTPATIENT)
Dept: PULMONOLOGY | Facility: HOSPITAL | Age: 45
End: 2025-07-22
Payer: COMMERCIAL

## 2025-07-22 VITALS
OXYGEN SATURATION: 96 % | BODY MASS INDEX: 36.79 KG/M2 | RESPIRATION RATE: 16 BRPM | WEIGHT: 257 LBS | SYSTOLIC BLOOD PRESSURE: 111 MMHG | DIASTOLIC BLOOD PRESSURE: 76 MMHG | HEART RATE: 86 BPM | HEIGHT: 70 IN

## 2025-07-22 DIAGNOSIS — J45.909 ASTHMA, UNSPECIFIED ASTHMA SEVERITY, UNSPECIFIED WHETHER COMPLICATED, UNSPECIFIED WHETHER PERSISTENT (HHS-HCC): Primary | ICD-10-CM

## 2025-07-22 DIAGNOSIS — J30.9 ALLERGIC RHINITIS, UNSPECIFIED SEASONALITY, UNSPECIFIED TRIGGER: ICD-10-CM

## 2025-07-22 DIAGNOSIS — F17.200 VAPING NICOTINE DEPENDENCE, NON-TOBACCO PRODUCT: ICD-10-CM

## 2025-07-22 PROCEDURE — 3008F BODY MASS INDEX DOCD: CPT | Performed by: NURSE PRACTITIONER

## 2025-07-22 PROCEDURE — 99212 OFFICE O/P EST SF 10 MIN: CPT

## 2025-07-22 PROCEDURE — 99213 OFFICE O/P EST LOW 20 MIN: CPT | Performed by: NURSE PRACTITIONER

## 2025-07-22 ASSESSMENT — ENCOUNTER SYMPTOMS
COUGH: 0
FEVER: 0
WHEEZING: 0
FATIGUE: 0
CHILLS: 0
UNEXPECTED WEIGHT CHANGE: 0
RHINORRHEA: 0
SHORTNESS OF BREATH: 1

## 2025-07-22 NOTE — PROGRESS NOTES
Subjective   Patient ID: Eugene Chang is a 45 y.o. male who presents for follow up asthma.     HPI: Patient has  PMH of asthma for which he takes Symbicort 1 puff once in the morning only and takes prn. He was detected positive for COVID on August 24th and had symptoms about 5 days before that. He was not hospitalized and did not get any specific treatment for that. He did not get vaccination for covid. He did have cough, wheezing and SOB severely for a week and after that he just had SOB which has improved. He still gets tired easily and short of breath more easily but is not limiting his daily life. His POX is 96% on RA. He does not have coughing or wheezing at this time other than his usual asthma. He has been seeing Dr. Bahena for chest pain and his stress test was negative. He had CT cardiac scoring that showed a lung nodule 4mm at left base and thoracic aortic enlargement of 3.6 cm. He used to smoke 1 ppd but quit 10 years ago, smoked 14 years x 1 ppd. For last 10 years he has been vaping 24 mg nicotine but no THC. He has done THC in the past. He has hx of snoring but no EDS or fatigue and reports he has Sleep study which he states was normal. He reports he does not have any sinus symptoms at present.      Patient is here today for follow up. He states that his breathing has been stable on Breo he was able to get this with a coupon. He states that when he is on this he does not have to use albuterol. Allergic rhinitis symptoms are currently controlled. He is vaping with nicotine.     Review of Systems   Constitutional:  Negative for chills, fatigue, fever and unexpected weight change.   HENT:  Positive for congestion. Negative for postnasal drip and rhinorrhea.    Respiratory:  Positive for shortness of breath. Negative for cough (denies hemoptysis.) and wheezing.    Cardiovascular:  Negative for chest pain and leg swelling.   All other systems reviewed and are negative.      Objective   Physical Exam  Vitals  reviewed.   Constitutional:       Appearance: Normal appearance.   HENT:      Head: Normocephalic.     Cardiovascular:      Rate and Rhythm: Normal rate and regular rhythm.   Pulmonary:      Effort: Pulmonary effort is normal.      Breath sounds: Normal breath sounds.     Skin:     General: Skin is warm and dry.     Neurological:      Mental Status: He is alert.         Assessment/Plan   1. Bronchial asthma  2. Allergic rhinitis   3. Vapes with nicotine      Plan:     -Continue on Breo daily he is getting this with coupon.   -Continue Allegra prn. Consider Singulair if s/s worsen again.  -We will follow up with chest imaging only as needed if he has respiratory symptoms.   -PFTs with FEV1/FVC 78, FEV1 90, .  -He is currently vaping with nicotine daily.  -CXR 1/2/25 shows lungs are clear with lingular atelectasis or scarring.     Overall I will continue current regimen breathing is stable when he is on Breo. I will see him back in one year. I instructed patient to call sooner if needed.

## 2025-07-22 NOTE — PATIENT INSTRUCTIONS
Continue on Breo one puff daily.  Continue albuterol as needed.   Call with any questions or concerns.  Follow up with me in one year.

## 2025-08-11 ENCOUNTER — APPOINTMENT (OUTPATIENT)
Dept: PRIMARY CARE | Facility: CLINIC | Age: 45
End: 2025-08-11
Payer: COMMERCIAL

## 2025-08-18 ENCOUNTER — APPOINTMENT (OUTPATIENT)
Dept: PRIMARY CARE | Facility: CLINIC | Age: 45
End: 2025-08-18
Payer: COMMERCIAL

## 2025-10-03 ENCOUNTER — APPOINTMENT (OUTPATIENT)
Dept: PRIMARY CARE | Facility: CLINIC | Age: 45
End: 2025-10-03
Payer: COMMERCIAL

## 2026-07-21 ENCOUNTER — APPOINTMENT (OUTPATIENT)
Dept: PULMONOLOGY | Facility: HOSPITAL | Age: 46
End: 2026-07-21
Payer: COMMERCIAL